# Patient Record
Sex: MALE | Race: ASIAN | NOT HISPANIC OR LATINO | URBAN - METROPOLITAN AREA
[De-identification: names, ages, dates, MRNs, and addresses within clinical notes are randomized per-mention and may not be internally consistent; named-entity substitution may affect disease eponyms.]

---

## 2023-08-14 PROBLEM — Z00.00 ENCOUNTER FOR PREVENTIVE HEALTH EXAMINATION: Status: ACTIVE | Noted: 2023-08-14

## 2023-08-15 ENCOUNTER — OUTPATIENT (OUTPATIENT)
Dept: OUTPATIENT SERVICES | Facility: HOSPITAL | Age: 63
LOS: 1 days | End: 2023-08-15
Payer: MEDICARE

## 2023-08-15 ENCOUNTER — APPOINTMENT (OUTPATIENT)
Dept: CT IMAGING | Facility: CLINIC | Age: 63
End: 2023-08-15
Payer: MEDICARE

## 2023-08-15 ENCOUNTER — APPOINTMENT (OUTPATIENT)
Dept: MRI IMAGING | Facility: CLINIC | Age: 63
End: 2023-08-15
Payer: MEDICARE

## 2023-08-15 DIAGNOSIS — C65.1 MALIGNANT NEOPLASM OF RIGHT RENAL PELVIS: ICD-10-CM

## 2023-08-15 DIAGNOSIS — R91.8 OTHER NONSPECIFIC ABNORMAL FINDING OF LUNG FIELD: ICD-10-CM

## 2023-08-15 PROCEDURE — 71250 CT THORAX DX C-: CPT | Mod: 26

## 2023-08-15 PROCEDURE — 71250 CT THORAX DX C-: CPT

## 2023-08-15 PROCEDURE — 74181 MRI ABDOMEN W/O CONTRAST: CPT

## 2023-08-15 PROCEDURE — 74181 MRI ABDOMEN W/O CONTRAST: CPT | Mod: 26

## 2023-09-12 ENCOUNTER — TRANSCRIPTION ENCOUNTER (OUTPATIENT)
Age: 63
End: 2023-09-12

## 2023-09-12 ENCOUNTER — APPOINTMENT (OUTPATIENT)
Dept: CARDIOLOGY | Facility: CLINIC | Age: 63
End: 2023-09-12
Payer: MEDICARE

## 2023-09-12 ENCOUNTER — NON-APPOINTMENT (OUTPATIENT)
Age: 63
End: 2023-09-12

## 2023-09-12 VITALS
WEIGHT: 190 LBS | OXYGEN SATURATION: 100 % | SYSTOLIC BLOOD PRESSURE: 137 MMHG | DIASTOLIC BLOOD PRESSURE: 83 MMHG | HEART RATE: 75 BPM | TEMPERATURE: 97.6 F | HEIGHT: 71.26 IN | RESPIRATION RATE: 18 BRPM | BODY MASS INDEX: 26.31 KG/M2

## 2023-09-12 DIAGNOSIS — Z85.528 PERSONAL HISTORY OF OTHER MALIGNANT NEOPLASM OF KIDNEY: ICD-10-CM

## 2023-09-12 PROCEDURE — 99205 OFFICE O/P NEW HI 60 MIN: CPT | Mod: 25

## 2023-09-12 PROCEDURE — 93000 ELECTROCARDIOGRAM COMPLETE: CPT

## 2023-09-12 PROCEDURE — 93306 TTE W/DOPPLER COMPLETE: CPT

## 2023-09-28 ENCOUNTER — APPOINTMENT (OUTPATIENT)
Dept: CARDIOLOGY | Facility: CLINIC | Age: 63
End: 2023-09-28

## 2023-09-28 ENCOUNTER — NON-APPOINTMENT (OUTPATIENT)
Age: 63
End: 2023-09-28

## 2023-10-27 ENCOUNTER — APPOINTMENT (OUTPATIENT)
Dept: NEPHROLOGY | Facility: CLINIC | Age: 63
End: 2023-10-27

## 2023-11-07 ENCOUNTER — NON-APPOINTMENT (OUTPATIENT)
Age: 63
End: 2023-11-07

## 2023-11-07 ENCOUNTER — APPOINTMENT (OUTPATIENT)
Dept: CARDIOLOGY | Facility: CLINIC | Age: 63
End: 2023-11-07
Payer: MEDICARE

## 2023-11-07 VITALS
SYSTOLIC BLOOD PRESSURE: 123 MMHG | DIASTOLIC BLOOD PRESSURE: 72 MMHG | TEMPERATURE: 97.3 F | WEIGHT: 190 LBS | RESPIRATION RATE: 18 BRPM | BODY MASS INDEX: 26.31 KG/M2 | OXYGEN SATURATION: 99 % | HEART RATE: 71 BPM

## 2023-11-07 PROCEDURE — 99214 OFFICE O/P EST MOD 30 MIN: CPT | Mod: 25

## 2023-11-07 PROCEDURE — 93000 ELECTROCARDIOGRAM COMPLETE: CPT

## 2023-11-14 ENCOUNTER — OUTPATIENT (OUTPATIENT)
Dept: OUTPATIENT SERVICES | Facility: HOSPITAL | Age: 63
LOS: 1 days | End: 2023-11-14
Payer: MEDICARE

## 2023-11-14 ENCOUNTER — APPOINTMENT (OUTPATIENT)
Dept: CT IMAGING | Facility: CLINIC | Age: 63
End: 2023-11-14
Payer: MEDICARE

## 2023-11-14 DIAGNOSIS — R91.8 OTHER NONSPECIFIC ABNORMAL FINDING OF LUNG FIELD: ICD-10-CM

## 2023-11-14 DIAGNOSIS — C65.1 MALIGNANT NEOPLASM OF RIGHT RENAL PELVIS: ICD-10-CM

## 2023-11-14 PROCEDURE — 71250 CT THORAX DX C-: CPT

## 2023-11-14 PROCEDURE — 71250 CT THORAX DX C-: CPT | Mod: 26

## 2023-12-05 ENCOUNTER — APPOINTMENT (OUTPATIENT)
Dept: CARDIOLOGY | Facility: CLINIC | Age: 63
End: 2023-12-05

## 2023-12-05 ENCOUNTER — APPOINTMENT (OUTPATIENT)
Dept: CARDIOLOGY | Facility: CLINIC | Age: 63
End: 2023-12-05
Payer: MEDICARE

## 2023-12-05 ENCOUNTER — NON-APPOINTMENT (OUTPATIENT)
Age: 63
End: 2023-12-05

## 2023-12-05 VITALS
BODY MASS INDEX: 26.31 KG/M2 | RESPIRATION RATE: 18 BRPM | SYSTOLIC BLOOD PRESSURE: 145 MMHG | TEMPERATURE: 98 F | HEART RATE: 74 BPM | DIASTOLIC BLOOD PRESSURE: 82 MMHG | OXYGEN SATURATION: 99 % | WEIGHT: 190 LBS

## 2023-12-05 PROCEDURE — 93000 ELECTROCARDIOGRAM COMPLETE: CPT | Mod: NC

## 2023-12-05 PROCEDURE — 99214 OFFICE O/P EST MOD 30 MIN: CPT | Mod: 25

## 2023-12-07 ENCOUNTER — APPOINTMENT (OUTPATIENT)
Dept: MRI IMAGING | Facility: CLINIC | Age: 63
End: 2023-12-07
Payer: MEDICARE

## 2023-12-07 ENCOUNTER — OUTPATIENT (OUTPATIENT)
Dept: OUTPATIENT SERVICES | Facility: HOSPITAL | Age: 63
LOS: 1 days | End: 2023-12-07
Payer: MEDICARE

## 2023-12-07 DIAGNOSIS — R91.8 OTHER NONSPECIFIC ABNORMAL FINDING OF LUNG FIELD: ICD-10-CM

## 2023-12-07 DIAGNOSIS — C65.1 MALIGNANT NEOPLASM OF RIGHT RENAL PELVIS: ICD-10-CM

## 2023-12-07 PROCEDURE — 74181 MRI ABDOMEN W/O CONTRAST: CPT | Mod: 26

## 2023-12-07 PROCEDURE — 74181 MRI ABDOMEN W/O CONTRAST: CPT

## 2023-12-12 ENCOUNTER — APPOINTMENT (OUTPATIENT)
Dept: THORACIC SURGERY | Facility: CLINIC | Age: 63
End: 2023-12-12
Payer: MEDICARE

## 2023-12-12 ENCOUNTER — NON-APPOINTMENT (OUTPATIENT)
Age: 63
End: 2023-12-12

## 2023-12-12 VITALS
RESPIRATION RATE: 17 BRPM | DIASTOLIC BLOOD PRESSURE: 73 MMHG | HEIGHT: 72 IN | WEIGHT: 190 LBS | HEART RATE: 75 BPM | SYSTOLIC BLOOD PRESSURE: 130 MMHG | OXYGEN SATURATION: 100 % | BODY MASS INDEX: 25.73 KG/M2

## 2023-12-12 DIAGNOSIS — Z83.511 FAMILY HISTORY OF GLAUCOMA: ICD-10-CM

## 2023-12-12 DIAGNOSIS — Z86.79 PERSONAL HISTORY OF OTHER DISEASES OF THE CIRCULATORY SYSTEM: ICD-10-CM

## 2023-12-12 DIAGNOSIS — R59.0 LOCALIZED ENLARGED LYMPH NODES: ICD-10-CM

## 2023-12-12 DIAGNOSIS — Z83.438 FAMILY HISTORY OF OTHER DISORDER OF LIPOPROTEIN METABOLISM AND OTHER LIPIDEMIA: ICD-10-CM

## 2023-12-12 DIAGNOSIS — Z83.3 FAMILY HISTORY OF DIABETES MELLITUS: ICD-10-CM

## 2023-12-12 DIAGNOSIS — R91.8 OTHER NONSPECIFIC ABNORMAL FINDING OF LUNG FIELD: ICD-10-CM

## 2023-12-12 DIAGNOSIS — Z82.49 FAMILY HISTORY OF ISCHEMIC HEART DISEASE AND OTHER DISEASES OF THE CIRCULATORY SYSTEM: ICD-10-CM

## 2023-12-12 DIAGNOSIS — Z86.69 PERSONAL HISTORY OF OTHER DISEASES OF THE NERVOUS SYSTEM AND SENSE ORGANS: ICD-10-CM

## 2023-12-12 DIAGNOSIS — Z84.1 FAMILY HISTORY OF DISORDERS OF KIDNEY AND URETER: ICD-10-CM

## 2023-12-12 DIAGNOSIS — Z87.891 PERSONAL HISTORY OF NICOTINE DEPENDENCE: ICD-10-CM

## 2023-12-12 DIAGNOSIS — Z87.09 PERSONAL HISTORY OF OTHER DISEASES OF THE RESPIRATORY SYSTEM: ICD-10-CM

## 2023-12-12 PROCEDURE — 99245 OFF/OP CONSLTJ NEW/EST HI 55: CPT

## 2023-12-13 PROBLEM — R91.8 MULTIPLE LUNG NODULES: Status: ACTIVE | Noted: 2023-12-10

## 2023-12-13 PROBLEM — Z86.69 HISTORY OF SLEEP APNEA: Status: RESOLVED | Noted: 2023-12-13 | Resolved: 2023-12-13

## 2023-12-13 PROBLEM — R59.0 MEDIASTINAL LYMPHADENOPATHY: Status: ACTIVE | Noted: 2023-12-10

## 2023-12-13 PROBLEM — Z87.09 HISTORY OF ASTHMA: Status: RESOLVED | Noted: 2023-12-13 | Resolved: 2023-12-13

## 2023-12-13 PROBLEM — Z83.438 FAMILY HISTORY OF HYPERLIPIDEMIA: Status: ACTIVE | Noted: 2023-12-13

## 2023-12-13 PROBLEM — Z82.49 FAMILY HISTORY OF HYPERTENSION: Status: ACTIVE | Noted: 2023-12-13

## 2023-12-13 PROBLEM — Z83.3 FAMILY HISTORY OF DIABETES MELLITUS: Status: ACTIVE | Noted: 2023-12-13

## 2023-12-13 PROBLEM — Z83.511 FAMILY HISTORY OF GLAUCOMA: Status: ACTIVE | Noted: 2023-12-13

## 2023-12-13 PROBLEM — Z86.79 HISTORY OF HYPERTENSION: Status: RESOLVED | Noted: 2023-12-13 | Resolved: 2023-12-13

## 2023-12-13 PROBLEM — Z87.891 FORMER SMOKER: Status: ACTIVE | Noted: 2023-09-12

## 2023-12-13 PROBLEM — Z84.1 FAMILY HISTORY OF KIDNEY DISEASE: Status: ACTIVE | Noted: 2023-12-13

## 2023-12-13 RX ORDER — ROSUVASTATIN CALCIUM 5 MG/1
5 TABLET, FILM COATED ORAL
Refills: 0 | Status: ACTIVE | COMMUNITY

## 2023-12-13 RX ORDER — ASPIRIN 81 MG
81 TABLET, DELAYED RELEASE (ENTERIC COATED) ORAL
Refills: 0 | Status: ACTIVE | COMMUNITY

## 2023-12-13 RX ORDER — TAMSULOSIN HYDROCHLORIDE 0.4 MG/1
0.4 CAPSULE ORAL
Refills: 0 | Status: ACTIVE | COMMUNITY

## 2023-12-13 NOTE — ASSESSMENT
[FreeTextEntry1] : Mr. MARKUS SPENCE, 63 year old male, former smoker (1/2 PPD x 45 years. Quit 5/ 2023), w/ hx of HTN, Asthma, sleep apnea, clear cell RCC s/p nephrectomy 5/16/23, CAD with DAVION placed to LCx 6/2023; On Plavix. Referred by Dr. Hon Mariajose Montemayor for further evaluation of bilateral lung nodules as well as enlarged AP LN.   I have independently reviewed the medical records and imaging at the time of this office consultation, and discussed the following interpretations with the patient: - CT imaging reviewed. Findings are suspicious. Discussed necessity of tissue diagnosis, which will be needed to dictate further treatment. Therefore, Recommend a Flex Bronch, EBUS, possible Right VATS, Wedge Resection of Right lower lobe. Risks, benefits and alternatives explained to patient; All questions answered and patient agrees to proceed with surgery.  - PFTs and Cardiac clearance required prior to procedure. Hold Plavix as per Cardiology's recs  Recommendations reviewed with patient during this office visit, and all questions answered; Patient instructed on the importance of follow up and verbalizes understanding.   I, CLAUDINE Coleman, personally performed the evaluation and management (E/M) services for this new patient. That E/M includes conducting the initial examination, assessing all conditions, and establishing the plan of care. Today, My ACP, Jennyfer Morrow, was here to observe my evaluation and management services for this patient to be followed going forward.

## 2023-12-13 NOTE — PHYSICAL EXAM
[Fully active, able to carry on all pre-disease performance without restriction] : Status 0 - Fully active, able to carry on all pre-disease performance without restriction [General Appearance - Alert] : alert [General Appearance - In No Acute Distress] : in no acute distress [General Appearance - Well Nourished] : well nourished [Sclera] : the sclera and conjunctiva were normal [Extraocular Movements] : extraocular movements were intact [Outer Ear] : the ears and nose were normal in appearance [Hearing Threshold Finger Rub Not Sawyer] : hearing was normal [Both Tympanic Membranes Were Examined] : both tympanic membranes were normal [Neck Appearance] : the appearance of the neck was normal [Neck Cervical Mass (___cm)] : no neck mass was observed [] : no respiratory distress [Respiration, Rhythm And Depth] : normal respiratory rhythm and effort [Exaggerated Use Of Accessory Muscles For Inspiration] : no accessory muscle use [Auscultation Breath Sounds / Voice Sounds] : lungs were clear to auscultation bilaterally [Heart Rate And Rhythm] : heart rate was normal and rhythm regular [Examination Of The Chest] : the chest was normal in appearance [Chest Visual Inspection Thoracic Asymmetry] : no chest asymmetry [Diminished Respiratory Excursion] : normal chest expansion [2+] : left 2+ [Breast Appearance] : normal in appearance [Breast Palpation Mass] : no palpable masses [Bowel Sounds] : normal bowel sounds [Abdomen Soft] : soft [Abdomen Tenderness] : non-tender [Cervical Lymph Nodes Enlarged Posterior Bilaterally] : posterior cervical [Cervical Lymph Nodes Enlarged Anterior Bilaterally] : anterior cervical [Supraclavicular Lymph Nodes Enlarged Bilaterally] : supraclavicular [No CVA Tenderness] : no ~M costovertebral angle tenderness [No Spinal Tenderness] : no spinal tenderness [Involuntary Movements] : no involuntary movements were seen [Skin Color & Pigmentation] : normal skin color and pigmentation [No Focal Deficits] : no focal deficits [Oriented To Time, Place, And Person] : oriented to person, place, and time [FreeTextEntry1] : Deferred

## 2023-12-13 NOTE — HISTORY OF PRESENT ILLNESS
[FreeTextEntry1] : Mr. MARKUS SPENCE, 63 year old male, former smoker (1/2 PPD x 45 years. Quit 5/ 2023), w/ hx of HTN, Asthma, sleep apnea, clear cell RCC s/p nephrectomy 5/16/23, CAD with DAVION placed to LCx 6/2023; On Plavix. Referred by Dr. Hon Mariajose Montemayor for further evaluation of bilateral lung nodules as well as enlarged AP LN.   CT Chest on 11/14/2023:  - JOHN 5mm nodule (3,66) new from prior study - RUL 8 mm nodule (3, 82) also new from the prior study - RLL 1.4 cm nodule (3, 135) is enlarged from the prior study  - Other Bilateral lung nodules. Some unchanged, some slightly enlarged from the prior study - 1.5 cm AP Window LN is enlarged from the prior study  - Hepatic Cysts  Patient presents today for consultation. Today, patient denies Chest pain, hemoptysis, fever, chills, night sweats, lightheadedness or dizziness.

## 2023-12-18 ENCOUNTER — OUTPATIENT (OUTPATIENT)
Dept: OUTPATIENT SERVICES | Facility: HOSPITAL | Age: 63
LOS: 1 days | End: 2023-12-18

## 2023-12-18 VITALS
TEMPERATURE: 98 F | RESPIRATION RATE: 16 BRPM | HEIGHT: 71 IN | WEIGHT: 192.02 LBS | HEART RATE: 75 BPM | DIASTOLIC BLOOD PRESSURE: 82 MMHG | OXYGEN SATURATION: 99 % | SYSTOLIC BLOOD PRESSURE: 145 MMHG

## 2023-12-18 DIAGNOSIS — I25.10 ATHEROSCLEROTIC HEART DISEASE OF NATIVE CORONARY ARTERY WITHOUT ANGINA PECTORIS: ICD-10-CM

## 2023-12-18 DIAGNOSIS — R59.0 LOCALIZED ENLARGED LYMPH NODES: ICD-10-CM

## 2023-12-18 DIAGNOSIS — Z90.5 ACQUIRED ABSENCE OF KIDNEY: Chronic | ICD-10-CM

## 2023-12-18 DIAGNOSIS — R91.8 OTHER NONSPECIFIC ABNORMAL FINDING OF LUNG FIELD: ICD-10-CM

## 2023-12-18 DIAGNOSIS — Z98.890 OTHER SPECIFIED POSTPROCEDURAL STATES: Chronic | ICD-10-CM

## 2023-12-18 LAB
ALBUMIN SERPL ELPH-MCNC: 4 G/DL — SIGNIFICANT CHANGE UP (ref 3.3–5)
ALBUMIN SERPL ELPH-MCNC: 4 G/DL — SIGNIFICANT CHANGE UP (ref 3.3–5)
ALP SERPL-CCNC: 61 U/L — SIGNIFICANT CHANGE UP (ref 40–120)
ALP SERPL-CCNC: 61 U/L — SIGNIFICANT CHANGE UP (ref 40–120)
ALT FLD-CCNC: 15 U/L — SIGNIFICANT CHANGE UP (ref 4–41)
ALT FLD-CCNC: 15 U/L — SIGNIFICANT CHANGE UP (ref 4–41)
ANION GAP SERPL CALC-SCNC: 9 MMOL/L — SIGNIFICANT CHANGE UP (ref 7–14)
ANION GAP SERPL CALC-SCNC: 9 MMOL/L — SIGNIFICANT CHANGE UP (ref 7–14)
AST SERPL-CCNC: 15 U/L — SIGNIFICANT CHANGE UP (ref 4–40)
AST SERPL-CCNC: 15 U/L — SIGNIFICANT CHANGE UP (ref 4–40)
BILIRUB SERPL-MCNC: 0.4 MG/DL — SIGNIFICANT CHANGE UP (ref 0.2–1.2)
BILIRUB SERPL-MCNC: 0.4 MG/DL — SIGNIFICANT CHANGE UP (ref 0.2–1.2)
BLD GP AB SCN SERPL QL: NEGATIVE — SIGNIFICANT CHANGE UP
BLD GP AB SCN SERPL QL: NEGATIVE — SIGNIFICANT CHANGE UP
BUN SERPL-MCNC: 26 MG/DL — HIGH (ref 7–23)
BUN SERPL-MCNC: 26 MG/DL — HIGH (ref 7–23)
CALCIUM SERPL-MCNC: 9.3 MG/DL — SIGNIFICANT CHANGE UP (ref 8.4–10.5)
CALCIUM SERPL-MCNC: 9.3 MG/DL — SIGNIFICANT CHANGE UP (ref 8.4–10.5)
CHLORIDE SERPL-SCNC: 100 MMOL/L — SIGNIFICANT CHANGE UP (ref 98–107)
CHLORIDE SERPL-SCNC: 100 MMOL/L — SIGNIFICANT CHANGE UP (ref 98–107)
CO2 SERPL-SCNC: 27 MMOL/L — SIGNIFICANT CHANGE UP (ref 22–31)
CO2 SERPL-SCNC: 27 MMOL/L — SIGNIFICANT CHANGE UP (ref 22–31)
CREAT SERPL-MCNC: 1.57 MG/DL — HIGH (ref 0.5–1.3)
CREAT SERPL-MCNC: 1.57 MG/DL — HIGH (ref 0.5–1.3)
EGFR: 49 ML/MIN/1.73M2 — LOW
EGFR: 49 ML/MIN/1.73M2 — LOW
GLUCOSE SERPL-MCNC: 82 MG/DL — SIGNIFICANT CHANGE UP (ref 70–99)
GLUCOSE SERPL-MCNC: 82 MG/DL — SIGNIFICANT CHANGE UP (ref 70–99)
HCT VFR BLD CALC: 38.6 % — LOW (ref 39–50)
HCT VFR BLD CALC: 38.6 % — LOW (ref 39–50)
HGB BLD-MCNC: 12.7 G/DL — LOW (ref 13–17)
HGB BLD-MCNC: 12.7 G/DL — LOW (ref 13–17)
MCHC RBC-ENTMCNC: 29.7 PG — SIGNIFICANT CHANGE UP (ref 27–34)
MCHC RBC-ENTMCNC: 29.7 PG — SIGNIFICANT CHANGE UP (ref 27–34)
MCHC RBC-ENTMCNC: 32.9 GM/DL — SIGNIFICANT CHANGE UP (ref 32–36)
MCHC RBC-ENTMCNC: 32.9 GM/DL — SIGNIFICANT CHANGE UP (ref 32–36)
MCV RBC AUTO: 90.2 FL — SIGNIFICANT CHANGE UP (ref 80–100)
MCV RBC AUTO: 90.2 FL — SIGNIFICANT CHANGE UP (ref 80–100)
NRBC # BLD: 0 /100 WBCS — SIGNIFICANT CHANGE UP (ref 0–0)
NRBC # BLD: 0 /100 WBCS — SIGNIFICANT CHANGE UP (ref 0–0)
NRBC # FLD: 0 K/UL — SIGNIFICANT CHANGE UP (ref 0–0)
NRBC # FLD: 0 K/UL — SIGNIFICANT CHANGE UP (ref 0–0)
PLATELET # BLD AUTO: 309 K/UL — SIGNIFICANT CHANGE UP (ref 150–400)
PLATELET # BLD AUTO: 309 K/UL — SIGNIFICANT CHANGE UP (ref 150–400)
POTASSIUM SERPL-MCNC: 4.3 MMOL/L — SIGNIFICANT CHANGE UP (ref 3.5–5.3)
POTASSIUM SERPL-MCNC: 4.3 MMOL/L — SIGNIFICANT CHANGE UP (ref 3.5–5.3)
POTASSIUM SERPL-SCNC: 4.3 MMOL/L — SIGNIFICANT CHANGE UP (ref 3.5–5.3)
POTASSIUM SERPL-SCNC: 4.3 MMOL/L — SIGNIFICANT CHANGE UP (ref 3.5–5.3)
PROT SERPL-MCNC: 7.9 G/DL — SIGNIFICANT CHANGE UP (ref 6–8.3)
PROT SERPL-MCNC: 7.9 G/DL — SIGNIFICANT CHANGE UP (ref 6–8.3)
RBC # BLD: 4.28 M/UL — SIGNIFICANT CHANGE UP (ref 4.2–5.8)
RBC # BLD: 4.28 M/UL — SIGNIFICANT CHANGE UP (ref 4.2–5.8)
RBC # FLD: 12.4 % — SIGNIFICANT CHANGE UP (ref 10.3–14.5)
RBC # FLD: 12.4 % — SIGNIFICANT CHANGE UP (ref 10.3–14.5)
RH IG SCN BLD-IMP: POSITIVE — SIGNIFICANT CHANGE UP
RH IG SCN BLD-IMP: POSITIVE — SIGNIFICANT CHANGE UP
SODIUM SERPL-SCNC: 136 MMOL/L — SIGNIFICANT CHANGE UP (ref 135–145)
SODIUM SERPL-SCNC: 136 MMOL/L — SIGNIFICANT CHANGE UP (ref 135–145)
WBC # BLD: 7.18 K/UL — SIGNIFICANT CHANGE UP (ref 3.8–10.5)
WBC # BLD: 7.18 K/UL — SIGNIFICANT CHANGE UP (ref 3.8–10.5)
WBC # FLD AUTO: 7.18 K/UL — SIGNIFICANT CHANGE UP (ref 3.8–10.5)
WBC # FLD AUTO: 7.18 K/UL — SIGNIFICANT CHANGE UP (ref 3.8–10.5)

## 2023-12-18 RX ORDER — SODIUM CHLORIDE 9 MG/ML
1000 INJECTION, SOLUTION INTRAVENOUS
Refills: 0 | Status: DISCONTINUED | OUTPATIENT
Start: 2023-12-22 | End: 2023-12-22

## 2023-12-18 NOTE — H&P PST ADULT - NSICDXPASTMEDICALHX_GEN_ALL_CORE_FT
PAST MEDICAL HISTORY:  CAD (coronary artery disease)     HTN (hypertension)     Lung disorder      PAST MEDICAL HISTORY:  CAD (coronary artery disease)     Cancer of right kidney     Former smoker     History of BPH     HLD (hyperlipidemia)     HTN (hypertension)     Lung disorder

## 2023-12-18 NOTE — H&P PST ADULT - CARDIOVASCULAR COMMENTS
Hx CAD 1 stent placed 6/5/23 and on Plavix / ASA - Cardio confirmed pt may stop Plavix 12/15/23 and stay on ASA

## 2023-12-18 NOTE — H&P PST ADULT - PROBLEM SELECTOR PLAN 1
Pt Name and  verified. Patient informed that results are still processing and verbalized understanding. Pt scheduled for surgery and preop instructions including instructions for taking Famotidine and for Chlorhexidine use in showering on the day of surgery, given verbally and with use of  written materials, and patient confirming understanding of such instructions using  teach back method.  Cardiac evaluation done and pt stopped Plavix 12/15/23 as per Cardiologist - request copy of note and of last Echo

## 2023-12-19 ENCOUNTER — APPOINTMENT (OUTPATIENT)
Dept: PULMONOLOGY | Facility: CLINIC | Age: 63
End: 2023-12-19
Payer: MEDICARE

## 2023-12-19 VITALS
WEIGHT: 193 LBS | BODY MASS INDEX: 27.32 KG/M2 | DIASTOLIC BLOOD PRESSURE: 68 MMHG | HEIGHT: 70.28 IN | OXYGEN SATURATION: 99 % | HEART RATE: 68 BPM | SYSTOLIC BLOOD PRESSURE: 109 MMHG

## 2023-12-19 LAB
RH IG SCN BLD-IMP: POSITIVE — SIGNIFICANT CHANGE UP
RH IG SCN BLD-IMP: POSITIVE — SIGNIFICANT CHANGE UP

## 2023-12-19 PROCEDURE — 94726 PLETHYSMOGRAPHY LUNG VOLUMES: CPT

## 2023-12-19 PROCEDURE — 94729 DIFFUSING CAPACITY: CPT

## 2023-12-19 PROCEDURE — 94010 BREATHING CAPACITY TEST: CPT

## 2023-12-19 NOTE — HISTORY OF PRESENT ILLNESS
[FreeTextEntry1] : He needs clearance for EBUS biopsy for lung nodule. He reports feeling stable. Denies chest pain or SOB. He is still exercising/walking at least 1 hour every day without symptoms.   11/7/23 - Pt saw Dr. Eddy (nephrology) underwent labs which showed Cr 1.7 on 9/22/23. He reports feeling stable without chest pain or SOB. No orthopnea, PND, or LE edema. He is exercising/walking at least 1 hour every day without limitation.  9/12/23 - Pt was in [facility] for vacation and underwent CCTA for routine checkup which found CAD. He was ultimately admitted to hospital in [facility] 6/4-6/6/23 with SOB on exertion x 6 months and underwent LHC which found 100% LCx stenosis with R to L collaterals. Based on report, it appears he underwent DAVION (2.5 x 18mm [employer] stent) to dLCx c/b vessel dissection. He has residual mid/distal RCA stenosis (80%) which was not intervened on otherwise normal LM and normal LAD. He also had an echo in [facility] 4/17/23 showing normal LVEF 60% with no significant valvular disease. He also has b/l lung nodules seen on CT 4/17/23.  Since coming back to the [facility], pt has seen oncologist who recommended 3 month f/u for his lung nodules. His recent labs showed BUN/Cr 33/1.95 7/31/23 and LDL 38. He states that prior to PCI, he had significant SOB and occasional chest discomfort with exertion. He is now walking at least 1 km per day on a daily basis without chest pain or SOB. He has no orthopnea, PND, LE edema, dizziness, palpitations, or LOC.

## 2023-12-19 NOTE — REASON FOR VISIT
[FreeTextEntry1] : 63 year old M with history of clear cell RCC s/p nephrectomy 5/16/23, CAD with DAVION placed to 100% LCx  with residual 80% m/d RCA stenosis 6/5/23 who presents for f/u.  He is on ASA 81, Plavix 75mg daily, and Crestor 5mg daily.

## 2023-12-19 NOTE — ASSESSMENT
[FreeTextEntry1] : 63 year old M with history of clear cell RCC s/p nephrectomy 5/16/23, CAD with DAVION placed to 100% LCx  with residual 80% m/d RCA stenosis 6/5/23 who presents for f/u.  He is on ASA 81, Plavix 75mg daily, and Crestor 5mg daily.  He needs clearance for EBUS biopsy for lung nodule. He reports feeling stable. Denies chest pain or SOB. He is still exercising/walking at least 1 hour every day without symptoms.   1) Pre-op cardiac clearance, for EBUS biopsy  - His RCRI is 2 and his risk factors likely elevate his cardiac risk for planned EBUS biopsy. However, he is asymptomatic and reports good exercise tolerance. - EKG 12/5/23 showed normal sinus rhythm without ischemic changes - There is no evidence of active ACS, arrhythmia, clinical heart failure, or significant valvular disease. He is medically optimized from cardiac standpoint and may proceed to planned EBUS biopsy without further cardiac testing - Given it has now been greater than 6 months s/p stent, he may hold Plavix for 5-7 days prior to EBUS biopsy and should resume as soon as safe from surgical perspective. He should continue ASA 81mg perioperatively.  2) CAD s/p stent - LHC 6/5/23 with 100% LCx stenosis with R to L collaterals. Based on report, it appears he underwent DAVION (2.5 x 18mm Ini3 Digital stent) to dLCx c/b vessel dissection. He has residual mid/distal RCA stenosis (80%) which was not intervened on otherwise normal LM and normal LAD. - TTE 9/12/23 showed grossly normal LVEF 65-70%, normal RV size/function, and mild TR - Continue ASA 81 and Plavix 75mg daily for total of 1 year after stenting - Continue Crestor 5mg daily, goal LDL 55 (last LDL was 38) - In view of CKD, lack of symptoms, good exercise tolerance after PCI, continue med management for now. If pt becomes symptomatic, will consider nuclear stress to assess for ischemia vs. LHC to intervene on RCA.  3) CKD, last Cr 1.7 - f/u with Dr. Eddy  4) Follow-up, 3 months or sooner if symptomatic

## 2023-12-21 ENCOUNTER — TRANSCRIPTION ENCOUNTER (OUTPATIENT)
Age: 63
End: 2023-12-21

## 2023-12-21 NOTE — ASU PATIENT PROFILE, ADULT - PACKS PER DAY
Please call patient. Will not need labs at time of physical- will plan to wait to do later in the year. 0.5

## 2023-12-21 NOTE — ASU PATIENT PROFILE, ADULT - NSICDXPASTMEDICALHX_GEN_ALL_CORE_FT
PAST MEDICAL HISTORY:  CAD (coronary artery disease)     Cancer of right kidney     Former smoker     History of BPH     HLD (hyperlipidemia)     HTN (hypertension)     Lung disorder

## 2023-12-21 NOTE — ASU PATIENT PROFILE, ADULT - NUMBER OF YRS
I, the Attending Physician, certify the above stated patient is homebound and upon completion of the Face-To-Face encounter, has the need for intermittent skilled nursing, physical therapy and/or speech or occupational therapy services in their home for their current diagnosis as outlined in their initiial plan of care. These services will continue to be monitored by myself or another physician
45

## 2023-12-21 NOTE — ASU PATIENT PROFILE, ADULT - FALL HARM RISK - UNIVERSAL INTERVENTIONS
Bed in lowest position, wheels locked, appropriate side rails in place/Call bell, personal items and telephone in reach/Instruct patient to call for assistance before getting out of bed or chair/Non-slip footwear when patient is out of bed/[place] to call system/Physically safe environment - no spills, clutter or unnecessary equipment/Purposeful Proactive Rounding/Room/bathroom lighting operational, light cord in reach Bed in lowest position, wheels locked, appropriate side rails in place/Call bell, personal items and telephone in reach/Instruct patient to call for assistance before getting out of bed or chair/Non-slip footwear when patient is out of bed/[place] to call system/Physically safe environment - no spills, clutter or unnecessary equipment/Purposeful Proactive Rounding/Room/bathroom lighting operational, light cord in reach

## 2023-12-22 ENCOUNTER — TRANSCRIPTION ENCOUNTER (OUTPATIENT)
Age: 63
End: 2023-12-22

## 2023-12-22 ENCOUNTER — RESULT REVIEW (OUTPATIENT)
Age: 63
End: 2023-12-22

## 2023-12-22 ENCOUNTER — APPOINTMENT (OUTPATIENT)
Dept: THORACIC SURGERY | Facility: HOSPITAL | Age: 63
End: 2023-12-22

## 2023-12-22 ENCOUNTER — INPATIENT (INPATIENT)
Facility: HOSPITAL | Age: 63
LOS: 0 days | Discharge: HOME | End: 2023-12-22
Attending: THORACIC SURGERY (CARDIOTHORACIC VASCULAR SURGERY) | Admitting: THORACIC SURGERY (CARDIOTHORACIC VASCULAR SURGERY)
Payer: MEDICARE

## 2023-12-22 VITALS
HEIGHT: 72 IN | DIASTOLIC BLOOD PRESSURE: 64 MMHG | WEIGHT: 190.92 LBS | RESPIRATION RATE: 16 BRPM | OXYGEN SATURATION: 98 % | TEMPERATURE: 98 F | HEART RATE: 73 BPM | SYSTOLIC BLOOD PRESSURE: 115 MMHG

## 2023-12-22 VITALS
HEART RATE: 74 BPM | OXYGEN SATURATION: 99 % | DIASTOLIC BLOOD PRESSURE: 68 MMHG | TEMPERATURE: 98 F | SYSTOLIC BLOOD PRESSURE: 140 MMHG | RESPIRATION RATE: 14 BRPM

## 2023-12-22 DIAGNOSIS — R59.0 LOCALIZED ENLARGED LYMPH NODES: ICD-10-CM

## 2023-12-22 DIAGNOSIS — Z90.5 ACQUIRED ABSENCE OF KIDNEY: Chronic | ICD-10-CM

## 2023-12-22 DIAGNOSIS — Z98.890 OTHER SPECIFIED POSTPROCEDURAL STATES: Chronic | ICD-10-CM

## 2023-12-22 PROCEDURE — 71045 X-RAY EXAM CHEST 1 VIEW: CPT | Mod: 26

## 2023-12-22 PROCEDURE — 31652 BRONCH EBUS SAMPLNG 1/2 NODE: CPT

## 2023-12-22 RX ORDER — ONDANSETRON 8 MG/1
4 TABLET, FILM COATED ORAL ONCE
Refills: 0 | Status: DISCONTINUED | OUTPATIENT
Start: 2023-12-22 | End: 2023-12-22

## 2023-12-22 RX ORDER — ACETAMINOPHEN 500 MG
650 TABLET ORAL ONCE
Refills: 0 | Status: DISCONTINUED | OUTPATIENT
Start: 2023-12-22 | End: 2023-12-22

## 2023-12-22 RX ORDER — ACETAMINOPHEN 500 MG
975 TABLET ORAL ONCE
Refills: 0 | Status: COMPLETED | OUTPATIENT
Start: 2023-12-22 | End: 2023-12-22

## 2023-12-22 RX ORDER — FENTANYL CITRATE 50 UG/ML
25 INJECTION INTRAVENOUS
Refills: 0 | Status: DISCONTINUED | OUTPATIENT
Start: 2023-12-22 | End: 2023-12-22

## 2023-12-22 RX ORDER — HEPARIN SODIUM 5000 [USP'U]/ML
5000 INJECTION INTRAVENOUS; SUBCUTANEOUS ONCE
Refills: 0 | Status: COMPLETED | OUTPATIENT
Start: 2023-12-22 | End: 2023-12-22

## 2023-12-22 RX ORDER — FENTANYL CITRATE 50 UG/ML
50 INJECTION INTRAVENOUS
Refills: 0 | Status: DISCONTINUED | OUTPATIENT
Start: 2023-12-22 | End: 2023-12-22

## 2023-12-22 RX ADMIN — HEPARIN SODIUM 5000 UNIT(S): 5000 INJECTION INTRAVENOUS; SUBCUTANEOUS at 10:14

## 2023-12-22 RX ADMIN — SODIUM CHLORIDE 30 MILLILITER(S): 9 INJECTION, SOLUTION INTRAVENOUS at 10:15

## 2023-12-22 RX ADMIN — Medication 975 MILLIGRAM(S): at 10:13

## 2023-12-22 NOTE — ASU DISCHARGE PLAN (ADULT/PEDIATRIC) - ASU DC SPECIAL INSTRUCTIONSFT
If you cough up blood tinged sputum it is normal. If you cough up more than a tablespoon of bright red blood and it continues. notify MD and return to ER

## 2023-12-22 NOTE — ASU DISCHARGE PLAN (ADULT/PEDIATRIC) - PROVIDER TOKENS
PROVIDER:[TOKEN:[[id number]],FOLLOWUP:[2 weeks]] PROVIDER:[TOKEN:[[id number]],FOLLOWUP:[2 weeks]] PROVIDER:[TOKEN:[[id number]],FOLLOWUP:[2 weeks],ESTABLISHEDPATIENT:[T]] PROVIDER:[TOKEN:[[id number]],FOLLOWUP:[2 weeks],ESTABLISHEDPATIENT:[T]]

## 2023-12-22 NOTE — BRIEF OPERATIVE NOTE - COMMENTS
JONES Villanueva, provided direct first assist support to the surgeon during this surgical procedure. My involvement included positioning, prepping and draping the patient prior to surgery, ensuring clear visibility and exposure for the surgeon by using instruments such as retractors and suction, closing surgical incisions and dressing wounds. As well as other tasks as directed by the surgeon.

## 2023-12-22 NOTE — ASU PREOP CHECKLIST - MEDICAL/PEDIATRIC CLEARANCE ON MEDICAL RECORD
----- Message from Susan Winters MA sent at 8/13/2019  3:47 PM CDT -----  Contact: Self      ----- Message -----  From: Thi Merrill  Sent: 8/13/2019   3:36 PM  To: [facility] Mal[facility] Staff    Geneva Wayne  MRN: [id number]  Home Phone      [phone]  Work Phone      Not on file.  Mobile          [phone]    Patient Care Team:  Sunita Sheriff MD as PCP - General (Family Medicine)  Kira Lloyd MD as Obstetrician (Obstetrics and Gynecology)  OB? No  What phone number can you be reached at? [phone]  Message:   Pt had colpo on 7/18 and has been bleeding since. Please advise.    cardiac

## 2023-12-22 NOTE — ASU DISCHARGE PLAN (ADULT/PEDIATRIC) - CARE PROVIDER_API CALL
Eugene Caraballo  Thoracic Surgery  [address] [place] [address], [address] [place] [address]  Phone: [phone]  Fax: [phone]  Follow Up Time: 2 weeks   Eugene Caraballo  Thoracic Surgery  [address] [place] [address], [address] [place] [address]  Phone: [phone]  Fax: [phone]  Follow Up Time: 2 weeks   Hon Sim Billy  Medical Oncology  [address] [facility], [address] [place] [address]  Phone: [phone]  Fax: [phone]  Established Patient  Follow Up Time: 2 weeks   Hon Sim Billy  Medical Oncology  [address] [facility], [address] [place] [address]  Phone: [phone]  Fax: [phone]  Established Patient  Follow Up Time: 2 weeks

## 2023-12-22 NOTE — ASU DISCHARGE PLAN (ADULT/PEDIATRIC) - COMMENTS
Please call Thoracic Surgery Office to schedule a follow-up appointment Please call Medical Onocology Office to schedule a follow-up appointment

## 2023-12-22 NOTE — ASU DISCHARGE PLAN (ADULT/PEDIATRIC) - NURSING INSTRUCTIONS
DO NOT take any Tylenol (Acetaminophen) or narcotics containing Tylenol until after  __4:15pm____ . You received Tylenol during your operation and it can cause damage to your liver if too much is taken within a 24 hour time period.

## 2023-12-22 NOTE — ASU DISCHARGE PLAN (ADULT/PEDIATRIC) - NS MD DC FALL RISK RISK
For information on Fall & Injury Prevention, visit: [email or web address] OR  [email or web address]/[email or web address]-tips-[email or web address]-injury OR  https://www.cdc.gov/steadi/patient.html For information on Fall & Injury Prevention, visit: [email or web address]://[email or web address]/[email or web address]-[email or web address]-mobility OR  [email or web address]://[email or web address]/[email or web address]-tips-[email or web address]-avoid-injury OR  https://www.cdc.gov/steadi/patient.html

## 2023-12-22 NOTE — BRIEF OPERATIVE NOTE - NSICDXBRIEFPROCEDURE_GEN_ALL_CORE_FT
PROCEDURES:  EBUS, with biopsy of mediastinal lymph node 22-Dec-2023 13:20:43  Fabian Rey  Flexible bronchoscopy 22-Dec-2023 13:20:50  Fabian Rey

## 2023-12-22 NOTE — ASU DISCHARGE PLAN (ADULT/PEDIATRIC) - CALL YOUR DOCTOR IF YOU HAVE ANY OF THE FOLLOWING:
Bleeding that does not stop/Inability to tolerate liquids or foods/Increased irritability or sluggishness Bleeding that does not stop/Fever greater than (need to indicate Fahrenheit or Celsius)/Inability to tolerate liquids or foods/Increased irritability or sluggishness

## 2023-12-22 NOTE — BRIEF OPERATIVE NOTE - OPERATION/FINDINGS
Flexible Bronchoscopy, Endobronchial Ultrasound FNA of Lymph Node Level R4. Flexible Bronchoscopy, Endobronchial Ultrasound FNA of Lymph Node Level L4.

## 2023-12-22 NOTE — ASU PREOP CHECKLIST - AS TEMP SITE
oral Cimetidine Counseling:  I discussed with the patient the risks of Cimetidine including but not limited to gynecomastia, headache, diarrhea, nausea, drowsiness, arrhythmias, pancreatitis, skin rashes, psychosis, bone marrow suppression and kidney toxicity.

## 2023-12-30 PROBLEM — J98.4 OTHER DISORDERS OF LUNG: Chronic | Status: ACTIVE | Noted: 2023-12-18

## 2023-12-30 PROBLEM — E78.5 HYPERLIPIDEMIA, UNSPECIFIED: Chronic | Status: ACTIVE | Noted: 2023-12-18

## 2023-12-30 PROBLEM — C64.1 MALIGNANT NEOPLASM OF RIGHT KIDNEY, EXCEPT RENAL PELVIS: Chronic | Status: ACTIVE | Noted: 2023-12-18

## 2023-12-30 PROBLEM — I10 ESSENTIAL (PRIMARY) HYPERTENSION: Chronic | Status: ACTIVE | Noted: 2023-12-18

## 2023-12-30 PROBLEM — I25.10 ATHEROSCLEROTIC HEART DISEASE OF NATIVE CORONARY ARTERY WITHOUT ANGINA PECTORIS: Chronic | Status: ACTIVE | Noted: 2023-12-18

## 2023-12-30 PROBLEM — Z87.891 PERSONAL HISTORY OF NICOTINE DEPENDENCE: Chronic | Status: ACTIVE | Noted: 2023-12-18

## 2023-12-30 PROBLEM — Z87.438 PERSONAL HISTORY OF OTHER DISEASES OF MALE GENITAL ORGANS: Chronic | Status: ACTIVE | Noted: 2023-12-18

## 2024-01-02 LAB
NON-GYNECOLOGICAL CYTOLOGY STUDY: SIGNIFICANT CHANGE UP
NON-GYNECOLOGICAL CYTOLOGY STUDY: SIGNIFICANT CHANGE UP

## 2024-01-09 ENCOUNTER — APPOINTMENT (OUTPATIENT)
Dept: CARDIOLOGY | Facility: CLINIC | Age: 64
End: 2024-01-09
Payer: MEDICARE

## 2024-01-09 VITALS
BODY MASS INDEX: 27.33 KG/M2 | OXYGEN SATURATION: 99 % | DIASTOLIC BLOOD PRESSURE: 74 MMHG | WEIGHT: 192 LBS | HEART RATE: 74 BPM | SYSTOLIC BLOOD PRESSURE: 127 MMHG | RESPIRATION RATE: 18 BRPM | TEMPERATURE: 97.7 F

## 2024-01-09 DIAGNOSIS — Z01.810 ENCOUNTER FOR PREPROCEDURAL CARDIOVASCULAR EXAMINATION: ICD-10-CM

## 2024-01-09 PROCEDURE — 99214 OFFICE O/P EST MOD 30 MIN: CPT

## 2024-01-09 PROCEDURE — G2211 COMPLEX E/M VISIT ADD ON: CPT

## 2024-01-10 ENCOUNTER — APPOINTMENT (OUTPATIENT)
Dept: CT IMAGING | Facility: IMAGING CENTER | Age: 64
End: 2024-01-10

## 2024-01-16 ENCOUNTER — NON-APPOINTMENT (OUTPATIENT)
Age: 64
End: 2024-01-16

## 2024-01-18 NOTE — ASU PATIENT PROFILE, ADULT - NSICDXPASTSURGICALHX_GEN_ALL_CORE_FT
PAST SURGICAL HISTORY:  H/O right nephrectomy     History of cardiac cath     History of percutaneous coronary intervention

## 2024-01-18 NOTE — ASU PATIENT PROFILE, ADULT - TELEPHONIC ID NUMBER OF THE INTERPRETER
-- DO NOT REPLY / DO NOT REPLY ALL --  -- Message is from Engagement Center Operations (EC[facility]) --    General Patient Message: Prakash is calling regarding sucralfate (CARAFATE) 1 GM/10ML suspension script and asking if alternative is okay. Prakash states this is very expensive and more affordable as tablets, made into a slurry. Nurses unavailable. Please call Prakash back to clarify.       Alternative phone number: no    Can a detailed message be left? Yes    Message Turnaround: [place]:    Refer to site's KB page for routing instructions    Please give this turnaround time to the caller:   \"You can expect to receive a response 2-3 business days after your provider's clinical team reviews the message\"              
Done - please inform patient.    Thank you.  Geeta Talavera MD  4/28/2023      
[id number]

## 2024-01-18 NOTE — ASU PATIENT PROFILE, ADULT - LANGUAGE ASSISTANCE NEEDED
Yes-Patient/Caregiver accepts free interpretation services... Patient requests  for wife discharge instruction/No-Patient/Caregiver offered and refused free interpretation services.

## 2024-01-18 NOTE — ASU PATIENT PROFILE, ADULT - NSICDXPASTMEDICALHX_GEN_ALL_CORE_FT
PAST MEDICAL HISTORY:  CAD (coronary artery disease)     Cancer of right kidney     Former smoker     H/O sleep apnea     History of BPH     History of nephrectomy right    HLD (hyperlipidemia)     HTN (hypertension)     Lung disorder

## 2024-01-21 ENCOUNTER — APPOINTMENT (OUTPATIENT)
Dept: NUCLEAR MEDICINE | Facility: IMAGING CENTER | Age: 64
End: 2024-01-21

## 2024-01-21 ENCOUNTER — APPOINTMENT (OUTPATIENT)
Dept: NUCLEAR MEDICINE | Facility: IMAGING CENTER | Age: 64
End: 2024-01-21
Payer: MEDICARE

## 2024-01-21 ENCOUNTER — OUTPATIENT (OUTPATIENT)
Dept: OUTPATIENT SERVICES | Facility: HOSPITAL | Age: 64
LOS: 1 days | End: 2024-01-21
Payer: MEDICARE

## 2024-01-21 DIAGNOSIS — C65.1 MALIGNANT NEOPLASM OF RIGHT RENAL PELVIS: ICD-10-CM

## 2024-01-21 DIAGNOSIS — Z90.5 ACQUIRED ABSENCE OF KIDNEY: Chronic | ICD-10-CM

## 2024-01-21 DIAGNOSIS — Z98.890 OTHER SPECIFIED POSTPROCEDURAL STATES: Chronic | ICD-10-CM

## 2024-01-21 DIAGNOSIS — Z98.61 CORONARY ANGIOPLASTY STATUS: Chronic | ICD-10-CM

## 2024-01-21 PROBLEM — Z86.69 PERSONAL HISTORY OF OTHER DISEASES OF THE NERVOUS SYSTEM AND SENSE ORGANS: Chronic | Status: ACTIVE | Noted: 2024-01-18

## 2024-01-21 PROCEDURE — 78815 PET IMAGE W/CT SKULL-THIGH: CPT

## 2024-01-21 PROCEDURE — 78815 PET IMAGE W/CT SKULL-THIGH: CPT | Mod: 26,PI

## 2024-01-21 PROCEDURE — A9552: CPT

## 2024-01-25 ENCOUNTER — APPOINTMENT (OUTPATIENT)
Dept: PULMONOLOGY | Facility: CLINIC | Age: 64
End: 2024-01-25

## 2024-02-01 ENCOUNTER — OUTPATIENT (OUTPATIENT)
Dept: OUTPATIENT SERVICES | Facility: HOSPITAL | Age: 64
LOS: 1 days | End: 2024-02-01
Payer: MEDICARE

## 2024-02-01 ENCOUNTER — TRANSCRIPTION ENCOUNTER (OUTPATIENT)
Age: 64
End: 2024-02-01

## 2024-02-01 VITALS
HEIGHT: 72 IN | HEART RATE: 65 BPM | RESPIRATION RATE: 15 BRPM | SYSTOLIC BLOOD PRESSURE: 138 MMHG | WEIGHT: 192.02 LBS | OXYGEN SATURATION: 98 % | DIASTOLIC BLOOD PRESSURE: 76 MMHG | TEMPERATURE: 98 F

## 2024-02-01 VITALS
DIASTOLIC BLOOD PRESSURE: 79 MMHG | HEART RATE: 70 BPM | OXYGEN SATURATION: 99 % | RESPIRATION RATE: 14 BRPM | SYSTOLIC BLOOD PRESSURE: 141 MMHG

## 2024-02-01 DIAGNOSIS — C65.1 MALIGNANT NEOPLASM OF RIGHT RENAL PELVIS: ICD-10-CM

## 2024-02-01 DIAGNOSIS — Z98.890 OTHER SPECIFIED POSTPROCEDURAL STATES: Chronic | ICD-10-CM

## 2024-02-01 DIAGNOSIS — Z90.5 ACQUIRED ABSENCE OF KIDNEY: Chronic | ICD-10-CM

## 2024-02-01 DIAGNOSIS — Z98.61 CORONARY ANGIOPLASTY STATUS: Chronic | ICD-10-CM

## 2024-02-01 PROCEDURE — 76937 US GUIDE VASCULAR ACCESS: CPT | Mod: 26

## 2024-02-01 PROCEDURE — 77001 FLUOROGUIDE FOR VEIN DEVICE: CPT | Mod: 26

## 2024-02-01 PROCEDURE — 36561 INSERT TUNNELED CV CATH: CPT | Mod: RT

## 2024-02-01 NOTE — ASU DISCHARGE PLAN (ADULT/PEDIATRIC) - NS MD DC FALL RISK RISK
For information on Fall & Injury Prevention, visit: [email or web address]-[email or web address]-mobility OR  [email or web address]://[email or web address]-[email or web address]-tips-[email or web address]-avoid-injury OR  https://www.cdc.gov/steadi/patient.html

## 2024-02-01 NOTE — H&P ADULT - HISTORY OF PRESENT ILLNESS
Interventional Radiology    HPI: 63y Male with history of clear cell RCC s/p nephrectomy 5/16/23, CKD, CAD with DAVION placed to 100% LCx  with residual 80% m/d RCA stenosis 6/5/23 who presents for f/u.  Presents to IR today for permcath placement.    Review of Systems:   Constitutional: No fever  Neurological: No headaches  Respiratory: No cough; No shortness of breath  Cardiovascular: No chest pain  Gastrointestinal: No abdominal or epigastric pain    Allergies: penicillin (Rash)    Medications (Abx/Cardiac/Anticoagulation/Blood Products)      Data:  182.9  87.09  T(C): 36.5  HR: 65  BP: 138/76  RR: 15  SpO2: 98%      Physical Exam  General: No acute distress, nontoxic, A&Ox3  Chest: Non labored breathing  Abdomen: Non-distended, non tender  Skin: No rashes or lesions    RADIOLOGY & ADDITIONAL TESTS:    Imaging Reviewed    H & P Note Reviewed from: __12/18/23_____    Plan: 63y Male presents for mediport placement  -Risks/Benefits/alternatives explained with the patient and/or healthcare proxy and witnessed informed consent obtained.

## 2024-02-01 NOTE — ASU DISCHARGE PLAN (ADULT/PEDIATRIC) - ASU DC SPECIAL INSTRUCTIONSFT
Chest Port Placement    Discharge Instructions  - You have had a chest port implanted in your chest.   - The port is ready for use.  - You may shower in 48 hours. No soaking or swimming for 2 weeks or until the site is completely healed.  - Keep the area covered and dry for the next 7 days. It may be removed by a chemotherapy nurse as needed for treatment.  - Do not perform any heavy lifting or put tension on the area for the next week or until the site is healed.  - You may resume your normal diet.  - You may resume your normal medications however you should wait 48 hours before restarting aspirin, plavix, or blood thinners.  - It is normal to experience some pain over the site for the next few days. You may take apply ice to the area (20 minutes on, 20 minutes off) and take Tylenol for that pain. Do not take more frequently than every 6 hours and do not exceed more than 3000mg of Tylenol in a 24 hour period.    - You were given conscious sedation which may make you drowsy, therefore you need someone to stay with you until the morning following the procedure.  - Do not drive, engage in heavy lifting or strenuous activity, or drink any alcoholic beverages for the next 24 hours.   - You may resume normal activity in 24 hours.    Notify your primary physician and/or Interventional Radiology IMMEDIATELY if you experience any of the following       - Fever of 100.4F or 38C       - Chills or Rigors/ Shakes       - Swelling and/or Redness in the area around the port       - Worsening Pain       - Blood soaked bandages or worsening bleeding       - Lightheadedness and/or dizziness upon standing       - Chest Pain/ Tightness       - Shortness of Breath       - Difficulty walking    If you have a problem that you believe requires IMMEDIATE attention, please go to your NEAREST Emergency Room. If you believe your problem can safely wait until you speak to a physician, please call Interventional Radiology for any concerns.    Please feel free to contact us at [phone] if any problems arise.

## 2024-02-05 PROCEDURE — 36561 INSERT TUNNELED CV CATH: CPT

## 2024-02-05 PROCEDURE — 76937 US GUIDE VASCULAR ACCESS: CPT

## 2024-02-05 PROCEDURE — 77001 FLUOROGUIDE FOR VEIN DEVICE: CPT

## 2024-02-05 PROCEDURE — C1894: CPT

## 2024-02-05 PROCEDURE — C1769: CPT

## 2024-02-05 PROCEDURE — C1788: CPT

## 2024-03-11 ENCOUNTER — APPOINTMENT (OUTPATIENT)
Dept: NUCLEAR MEDICINE | Facility: CLINIC | Age: 64
End: 2024-03-11
Payer: MEDICARE

## 2024-03-11 ENCOUNTER — OUTPATIENT (OUTPATIENT)
Dept: OUTPATIENT SERVICES | Facility: HOSPITAL | Age: 64
LOS: 1 days | End: 2024-03-11
Payer: MEDICARE

## 2024-03-11 DIAGNOSIS — Z98.61 CORONARY ANGIOPLASTY STATUS: Chronic | ICD-10-CM

## 2024-03-11 DIAGNOSIS — C65.1 MALIGNANT NEOPLASM OF RIGHT RENAL PELVIS: ICD-10-CM

## 2024-03-11 DIAGNOSIS — Z98.890 OTHER SPECIFIED POSTPROCEDURAL STATES: Chronic | ICD-10-CM

## 2024-03-11 DIAGNOSIS — R91.8 OTHER NONSPECIFIC ABNORMAL FINDING OF LUNG FIELD: ICD-10-CM

## 2024-03-11 DIAGNOSIS — Z90.5 ACQUIRED ABSENCE OF KIDNEY: Chronic | ICD-10-CM

## 2024-03-11 PROCEDURE — 78815 PET IMAGE W/CT SKULL-THIGH: CPT | Mod: 26,PI

## 2024-03-11 PROCEDURE — 78815 PET IMAGE W/CT SKULL-THIGH: CPT

## 2024-03-11 PROCEDURE — A9552: CPT

## 2024-03-17 ENCOUNTER — INPATIENT (INPATIENT)
Facility: HOSPITAL | Age: 64
LOS: 2 days | Discharge: HOME | DRG: 392 | End: 2024-03-20
Attending: STUDENT IN AN ORGANIZED HEALTH CARE EDUCATION/TRAINING PROGRAM | Admitting: STUDENT IN AN ORGANIZED HEALTH CARE EDUCATION/TRAINING PROGRAM
Payer: MEDICARE

## 2024-03-17 VITALS
TEMPERATURE: 97 F | HEIGHT: 72 IN | DIASTOLIC BLOOD PRESSURE: 79 MMHG | OXYGEN SATURATION: 98 % | WEIGHT: 182.1 LBS | HEART RATE: 94 BPM | RESPIRATION RATE: 17 BRPM | SYSTOLIC BLOOD PRESSURE: 129 MMHG

## 2024-03-17 DIAGNOSIS — Z98.61 CORONARY ANGIOPLASTY STATUS: Chronic | ICD-10-CM

## 2024-03-17 DIAGNOSIS — R19.7 DIARRHEA, UNSPECIFIED: ICD-10-CM

## 2024-03-17 DIAGNOSIS — I25.10 ATHEROSCLEROTIC HEART DISEASE OF NATIVE CORONARY ARTERY WITHOUT ANGINA PECTORIS: ICD-10-CM

## 2024-03-17 DIAGNOSIS — Z98.890 OTHER SPECIFIED POSTPROCEDURAL STATES: Chronic | ICD-10-CM

## 2024-03-17 DIAGNOSIS — Z29.9 ENCOUNTER FOR PROPHYLACTIC MEASURES, UNSPECIFIED: ICD-10-CM

## 2024-03-17 DIAGNOSIS — I10 ESSENTIAL (PRIMARY) HYPERTENSION: ICD-10-CM

## 2024-03-17 DIAGNOSIS — Z90.5 ACQUIRED ABSENCE OF KIDNEY: Chronic | ICD-10-CM

## 2024-03-17 DIAGNOSIS — N17.9 ACUTE KIDNEY FAILURE, UNSPECIFIED: ICD-10-CM

## 2024-03-17 DIAGNOSIS — C64.9 MALIGNANT NEOPLASM OF UNSPECIFIED KIDNEY, EXCEPT RENAL PELVIS: ICD-10-CM

## 2024-03-17 LAB
ALBUMIN SERPL ELPH-MCNC: 2.6 G/DL — LOW (ref 3.3–5)
ALP SERPL-CCNC: 55 U/L — SIGNIFICANT CHANGE UP (ref 40–120)
ALT FLD-CCNC: 63 U/L — SIGNIFICANT CHANGE UP (ref 12–78)
ANION GAP SERPL CALC-SCNC: 8 MMOL/L — SIGNIFICANT CHANGE UP (ref 5–17)
APPEARANCE UR: CLEAR — SIGNIFICANT CHANGE UP
APTT BLD: 31 SEC — SIGNIFICANT CHANGE UP (ref 24.5–35.6)
AST SERPL-CCNC: 34 U/L — SIGNIFICANT CHANGE UP (ref 15–37)
BASOPHILS # BLD AUTO: 0 K/UL — SIGNIFICANT CHANGE UP (ref 0–0.2)
BASOPHILS NFR BLD AUTO: 0 % — SIGNIFICANT CHANGE UP (ref 0–2)
BILIRUB SERPL-MCNC: 0.2 MG/DL — SIGNIFICANT CHANGE UP (ref 0.2–1.2)
BILIRUB UR-MCNC: NEGATIVE — SIGNIFICANT CHANGE UP
BUN SERPL-MCNC: 11 MG/DL — SIGNIFICANT CHANGE UP (ref 7–23)
CALCIUM SERPL-MCNC: 8 MG/DL — LOW (ref 8.5–10.1)
CHLORIDE SERPL-SCNC: 109 MMOL/L — HIGH (ref 96–108)
CO2 SERPL-SCNC: 24 MMOL/L — SIGNIFICANT CHANGE UP (ref 22–31)
COLOR SPEC: YELLOW — SIGNIFICANT CHANGE UP
CREAT SERPL-MCNC: 1.4 MG/DL — HIGH (ref 0.5–1.3)
DIFF PNL FLD: NEGATIVE — SIGNIFICANT CHANGE UP
EGFR: 56 ML/MIN/1.73M2 — LOW
EOSINOPHIL # BLD AUTO: 0 K/UL — SIGNIFICANT CHANGE UP (ref 0–0.5)
EOSINOPHIL NFR BLD AUTO: 0 % — SIGNIFICANT CHANGE UP (ref 0–6)
GI PCR PANEL: SIGNIFICANT CHANGE UP
GLUCOSE SERPL-MCNC: 116 MG/DL — HIGH (ref 70–99)
GLUCOSE UR QL: NEGATIVE MG/DL — SIGNIFICANT CHANGE UP
HCT VFR BLD CALC: 42.3 % — SIGNIFICANT CHANGE UP (ref 39–50)
HGB BLD-MCNC: 13.9 G/DL — SIGNIFICANT CHANGE UP (ref 13–17)
INR BLD: 0.99 RATIO — SIGNIFICANT CHANGE UP (ref 0.85–1.18)
KETONES UR-MCNC: NEGATIVE MG/DL — SIGNIFICANT CHANGE UP
LACTATE SERPL-SCNC: 1.7 MMOL/L — SIGNIFICANT CHANGE UP (ref 0.7–2)
LACTATE SERPL-SCNC: 2.8 MMOL/L — HIGH (ref 0.7–2)
LEUKOCYTE ESTERASE UR-ACNC: NEGATIVE — SIGNIFICANT CHANGE UP
LYMPHOCYTES # BLD AUTO: 0.83 K/UL — LOW (ref 1–3.3)
LYMPHOCYTES # BLD AUTO: 7 % — LOW (ref 13–44)
MCHC RBC-ENTMCNC: 29 PG — SIGNIFICANT CHANGE UP (ref 27–34)
MCHC RBC-ENTMCNC: 32.9 GM/DL — SIGNIFICANT CHANGE UP (ref 32–36)
MCV RBC AUTO: 88.3 FL — SIGNIFICANT CHANGE UP (ref 80–100)
MONOCYTES # BLD AUTO: 2.14 K/UL — HIGH (ref 0–0.9)
MONOCYTES NFR BLD AUTO: 18 % — HIGH (ref 2–14)
NEUTROPHILS # BLD AUTO: 7.84 K/UL — HIGH (ref 1.8–7.4)
NEUTROPHILS NFR BLD AUTO: 25 % — LOW (ref 43–77)
NITRITE UR-MCNC: NEGATIVE — SIGNIFICANT CHANGE UP
NRBC # BLD: SIGNIFICANT CHANGE UP /100 WBCS (ref 0–0)
PH UR: 6 — SIGNIFICANT CHANGE UP (ref 5–8)
PLATELET # BLD AUTO: 285 K/UL — SIGNIFICANT CHANGE UP (ref 150–400)
POTASSIUM SERPL-MCNC: 4.2 MMOL/L — SIGNIFICANT CHANGE UP (ref 3.5–5.3)
POTASSIUM SERPL-SCNC: 4.2 MMOL/L — SIGNIFICANT CHANGE UP (ref 3.5–5.3)
PROT SERPL-MCNC: 6.5 G/DL — SIGNIFICANT CHANGE UP (ref 6–8.3)
PROT UR-MCNC: SIGNIFICANT CHANGE UP MG/DL
PROTHROM AB SERPL-ACNC: 11.6 SEC — SIGNIFICANT CHANGE UP (ref 9.5–13)
RAPID RVP RESULT: SIGNIFICANT CHANGE UP
RBC # BLD: 4.79 M/UL — SIGNIFICANT CHANGE UP (ref 4.2–5.8)
RBC # FLD: 13.2 % — SIGNIFICANT CHANGE UP (ref 10.3–14.5)
SARS-COV-2 RNA SPEC QL NAA+PROBE: SIGNIFICANT CHANGE UP
SODIUM SERPL-SCNC: 141 MMOL/L — SIGNIFICANT CHANGE UP (ref 135–145)
SP GR SPEC: 1.01 — SIGNIFICANT CHANGE UP (ref 1–1.03)
TROPONIN I, HIGH SENSITIVITY RESULT: 7.6 NG/L — SIGNIFICANT CHANGE UP
UROBILINOGEN FLD QL: 0.2 MG/DL — SIGNIFICANT CHANGE UP (ref 0.2–1)
WBC # BLD: 11.88 K/UL — HIGH (ref 3.8–10.5)
WBC # FLD AUTO: 11.88 K/UL — HIGH (ref 3.8–10.5)

## 2024-03-17 PROCEDURE — 71045 X-RAY EXAM CHEST 1 VIEW: CPT | Mod: 26

## 2024-03-17 PROCEDURE — 99285 EMERGENCY DEPT VISIT HI MDM: CPT

## 2024-03-17 PROCEDURE — 93010 ELECTROCARDIOGRAM REPORT: CPT

## 2024-03-17 PROCEDURE — 99222 1ST HOSP IP/OBS MODERATE 55: CPT | Mod: GC

## 2024-03-17 PROCEDURE — 74176 CT ABD & PELVIS W/O CONTRAST: CPT | Mod: 26,MC

## 2024-03-17 RX ORDER — HEPARIN SODIUM 5000 [USP'U]/ML
5000 INJECTION INTRAVENOUS; SUBCUTANEOUS EVERY 12 HOURS
Refills: 0 | Status: DISCONTINUED | OUTPATIENT
Start: 2024-03-17 | End: 2024-03-20

## 2024-03-17 RX ORDER — LANOLIN ALCOHOL/MO/W.PET/CERES
3 CREAM (GRAM) TOPICAL AT BEDTIME
Refills: 0 | Status: DISCONTINUED | OUTPATIENT
Start: 2024-03-17 | End: 2024-03-20

## 2024-03-17 RX ORDER — ONDANSETRON 8 MG/1
4 TABLET, FILM COATED ORAL EVERY 8 HOURS
Refills: 0 | Status: DISCONTINUED | OUTPATIENT
Start: 2024-03-17 | End: 2024-03-20

## 2024-03-17 RX ORDER — CLOPIDOGREL BISULFATE 75 MG/1
75 TABLET, FILM COATED ORAL DAILY
Refills: 0 | Status: DISCONTINUED | OUTPATIENT
Start: 2024-03-17 | End: 2024-03-20

## 2024-03-17 RX ORDER — ASPIRIN/CALCIUM CARB/MAGNESIUM 324 MG
81 TABLET ORAL DAILY
Refills: 0 | Status: DISCONTINUED | OUTPATIENT
Start: 2024-03-17 | End: 2024-03-18

## 2024-03-17 RX ORDER — ACETAMINOPHEN 500 MG
1000 TABLET ORAL ONCE
Refills: 0 | Status: COMPLETED | OUTPATIENT
Start: 2024-03-17 | End: 2024-03-17

## 2024-03-17 RX ORDER — SODIUM CHLORIDE 9 MG/ML
1000 INJECTION INTRAMUSCULAR; INTRAVENOUS; SUBCUTANEOUS ONCE
Refills: 0 | Status: COMPLETED | OUTPATIENT
Start: 2024-03-17 | End: 2024-03-17

## 2024-03-17 RX ORDER — SODIUM CHLORIDE 9 MG/ML
1000 INJECTION INTRAMUSCULAR; INTRAVENOUS; SUBCUTANEOUS
Refills: 0 | Status: DISCONTINUED | OUTPATIENT
Start: 2024-03-17 | End: 2024-03-18

## 2024-03-17 RX ORDER — ERTAPENEM SODIUM 1 G/1
1000 INJECTION, POWDER, LYOPHILIZED, FOR SOLUTION INTRAMUSCULAR; INTRAVENOUS ONCE
Refills: 0 | Status: COMPLETED | OUTPATIENT
Start: 2024-03-17 | End: 2024-03-17

## 2024-03-17 RX ORDER — CLOPIDOGREL BISULFATE 75 MG/1
1 TABLET, FILM COATED ORAL
Refills: 0 | DISCHARGE

## 2024-03-17 RX ORDER — ASPIRIN/CALCIUM CARB/MAGNESIUM 324 MG
1 TABLET ORAL
Refills: 0 | DISCHARGE

## 2024-03-17 RX ORDER — TAMSULOSIN HYDROCHLORIDE 0.4 MG/1
1 CAPSULE ORAL
Refills: 0 | DISCHARGE

## 2024-03-17 RX ORDER — ACETAMINOPHEN 500 MG
650 TABLET ORAL EVERY 6 HOURS
Refills: 0 | Status: DISCONTINUED | OUTPATIENT
Start: 2024-03-17 | End: 2024-03-20

## 2024-03-17 RX ORDER — ROSUVASTATIN CALCIUM 5 MG/1
1 TABLET ORAL
Refills: 0 | DISCHARGE

## 2024-03-17 RX ORDER — MORPHINE SULFATE 50 MG/1
2 CAPSULE, EXTENDED RELEASE ORAL EVERY 4 HOURS
Refills: 0 | Status: DISCONTINUED | OUTPATIENT
Start: 2024-03-17 | End: 2024-03-19

## 2024-03-17 RX ORDER — ATORVASTATIN CALCIUM 80 MG/1
20 TABLET, FILM COATED ORAL AT BEDTIME
Refills: 0 | Status: DISCONTINUED | OUTPATIENT
Start: 2024-03-17 | End: 2024-03-20

## 2024-03-17 RX ADMIN — ATORVASTATIN CALCIUM 20 MILLIGRAM(S): 80 TABLET, FILM COATED ORAL at 22:49

## 2024-03-17 RX ADMIN — Medication 400 MILLIGRAM(S): at 18:48

## 2024-03-17 RX ADMIN — ERTAPENEM SODIUM 120 MILLIGRAM(S): 1 INJECTION, POWDER, LYOPHILIZED, FOR SOLUTION INTRAMUSCULAR; INTRAVENOUS at 17:23

## 2024-03-17 RX ADMIN — SODIUM CHLORIDE 80 MILLILITER(S): 9 INJECTION INTRAMUSCULAR; INTRAVENOUS; SUBCUTANEOUS at 20:44

## 2024-03-17 RX ADMIN — SODIUM CHLORIDE 1000 MILLILITER(S): 9 INJECTION INTRAMUSCULAR; INTRAVENOUS; SUBCUTANEOUS at 18:48

## 2024-03-17 RX ADMIN — SODIUM CHLORIDE 1000 MILLILITER(S): 9 INJECTION INTRAMUSCULAR; INTRAVENOUS; SUBCUTANEOUS at 16:16

## 2024-03-17 RX ADMIN — Medication 1000 MILLIGRAM(S): at 19:03

## 2024-03-17 NOTE — H&P ADULT - NSHPPHYSICALEXAM_GEN_ALL_CORE
T(C): 36.8 (03-17-24 @ 19:33), Max: 36.8 (03-17-24 @ 19:33)  HR: 90 (03-17-24 @ 19:33) (90 - 94)  BP: 143/72 (03-17-24 @ 19:33) (129/79 - 154/89)  RR: 18 (03-17-24 @ 19:33) (16 - 18)  SpO2: 97% (03-17-24 @ 19:33) (97% - 98%)    PHYSICAL EXAM:  GENERAL: NAD, lying in bed comfortably  HEAD:  Atraumatic, Normocephalic  EYES: EOMI, PERRLA, conjunctiva and sclera clear  ENT: Dry mucous membranes  NECK: Supple, No JVD  CHEST/LUNG: Clear to auscultation bilaterally; No rales, rhonchi, wheezing, or rubs. Unlabored respirations  HEART: Regular rate and rhythm; No murmurs, rubs, or gallops  ABDOMEN: Bowel sounds present; Soft, Nontender, Nondistended. No hepatomegally  EXTREMITIES:  2+ Peripheral Pulses, brisk capillary refill. No clubbing, cyanosis, or edema  NERVOUS SYSTEM:  Alert & Oriented X3, speech clear. No deficits   MSK: FROM all 4 extremities, full and equal strength  SKIN: No rashes or lesions

## 2024-03-17 NOTE — PATIENT PROFILE ADULT - NSPROHMSYMPCOND_GEN_A_NUR
1120 Dr Fierro notified of patient's B/P this AM. Received telephone order from Dr Fierro to modify PRN Clonidine order to say\" or\" instead of \"and\". 1158 PRN clonidine given for B/P of 145/103. Patient signed AMA request at 1145. Dr Fierro notified of AMA request at 1150. Patient remains calm and cooperative on the unit.   cancer/genitourinary

## 2024-03-17 NOTE — ED ADULT NURSE NOTE - NSFALLHARMRISKINTERV_ED_ALL_ED

## 2024-03-17 NOTE — ED PROVIDER NOTE - DIFFERENTIAL DIAGNOSIS
Differential Diagnosis Patient presenting to the emergency room with chief complaint of weakness diarrhea.  Differential includes but not limited to possible viral etiology versus bacterial colitis versus diverticulitis versus additional intra-abdominal pathology.  Patient is at higher risk as he is immunocompromise.  Will obtain screening septic workup send stool for Studies the patient able to have bowel movement obtain CT imaging of the abdomen pelvis EKG hydrate medicate as needed for symptoms and monitor.  Patient will likely require admission for further workup and evaluation.  Will begin antibiotics as needed.  This patient is also complaining of weakness generalized will obtain EKG and troponin to rule out cardiac pathology.

## 2024-03-17 NOTE — ED ADULT NURSE NOTE - OBJECTIVE STATEMENT
Pt is AOX4, came from home to the ED with c/o ABD pain, lethargy, n/v/d for 8 days, SOB. Pt states he is receiving CA (pulmonary and renal CA) tx with MD Montemayor, last tx 3 weeks ago. Denies chills/body aches/fevers. Pt has c/o ABD pain rated 6 out of 10. Pt does have hxHTN, cardiac stent, Pt is on plavix. CM/ in place. Pending lab and radiology orders. care ongoing.

## 2024-03-17 NOTE — ED PROVIDER NOTE - OBJECTIVE STATEMENT
63 M hx former smoker, cancer right kidney s/p nephrectomy (in [place]), cad s/p stents (on asa/plavix), bph, htn, hld c/o diarrhea for the past 8 days leading to feeling dehydrated, abd discomfort. Decreased oral intake, feeling very week. Has right chest port from last month. Receives immunotherapy chemo (total 3 doses, last was 3 weeks ago, next dose due this upcoming tuesday). His pmd prescribed him cipro/flagyl for symptoms, started 4 days ago, no help.     pmd kevin ([place])  heme onc Hon Montemayor  nephrologist in [place]

## 2024-03-17 NOTE — H&P ADULT - PROBLEM SELECTOR PLAN 2
Patient with kidney cancer s/p right nephrectomy in [facility] in 2023  Patient is on immunotherapy, received a total of 3 doses, last dose 3 weeks ago, next dose scheduled for 3/19   - Heme Onc Dr. Montemayor consulted, appreciate recs

## 2024-03-17 NOTE — ED PROVIDER NOTE - CLINICAL SUMMARY MEDICAL DECISION MAKING FREE TEXT BOX
Patient is a 63-year-old male who presents to the emergency room with a chief complaint of weakness.  Past medical history of renal cancer of the right kidney status post nephrectomy CAD status post stents BPH hypertension hyperlipidemia.  Patient is a former smoker.  Reports he has been experiencing diarrhea for the last 8 days and is now feeling weak and dehydrated.  Further noting abdominal discomfort decreased oral intake.  Currently receiving immuno chemotherapy last dose 3 weeks ago has a next dose upcoming this Tuesday.  He had spoken with his PMD regarding symptoms and was prescribed Cipro and Flagyl which began 4 days ago but has not helped with any of his symptoms.  Patient reports he is unable to keep anything down at this point.  On exam patient is lying in bed appears frail normocephalic atraumatic pupils are equal round and reactive hearts regular rate lungs clear to auscultation abdomen soft with mild tenderness to palpation in all quadrants although no guarding or rebound positive bowel sounds noted.  Patient presenting to the emergency room with chief complaint of weakness diarrhea.  Differential includes but not limited to possible viral etiology versus bacterial colitis versus diverticulitis versus additional intra-abdominal pathology.  Patient is at higher risk as he is immunocompromise.  Will obtain screening septic workup send stool for Studies the patient able to have bowel movement obtain CT imaging of the abdomen pelvis EKG hydrate medicate as needed for symptoms and monitor.  Patient will likely require admission for further workup and evaluation.  Will begin antibiotics as needed.  This patient is also complaining of weakness generalized will obtain EKG and troponin to rule out cardiac pathology.  Independent review of chest x-ray reveals no acute infiltrate.  Independent review of EKG reveals a normal sinus rhythm at 91 bpm.  Patient signed out pending CT and admission

## 2024-03-17 NOTE — H&P ADULT - PROBLEM SELECTOR PLAN 3
BUN/Cr= 11/1.4; baseline unclear   - PUNEET likely secondary to dehydration d/t diarrhea and poor PO intake   - Continue IV fluids  - F/u AM labs

## 2024-03-17 NOTE — ED ADULT TRIAGE NOTE - CHIEF COMPLAINT QUOTE
Hx Renal CA with mets to Lung.  Currently recieving chemo.  Increased weakness.  N/V/D.  Dr Montemayor Hem/Onc

## 2024-03-17 NOTE — H&P ADULT - NSHPSOCIALHISTORY_GEN_ALL_CORE
Smoking: past smoker of 45 years, quit 1 year ago   Alcohol: quit alcohol use  Denies recreational drug use   Lives with wife  Independent with ADLs

## 2024-03-17 NOTE — H&P ADULT - ATTENDING COMMENTS
63 M hx former smoker, cancer right kidney s/p nephrectomy (in [place]), cad s/p stents (on asa/plavix), bph, htn, hld c/o diarrhea for the past 8 days leading to feeling dehydrated along with abd discomfort.   agree with resident note.  pt with elevated WBC and 41% bands on immunotherapy for cancer treatment and with diarrhea x8 days, lactate elevated now WNL  defer abx for now, f/u ID recs, f/u stool studies and CDiff, continue IV fluids  f/u heme/onc Dr. Montemayor  f/u ID recs

## 2024-03-17 NOTE — H&P ADULT - ASSESSMENT
63 M hx former smoker, cancer right kidney s/p nephrectomy (in [place]), cad s/p stents (on asa/plavix), bph, htn, hld c/o diarrhea for the past 8 days leading to feeling dehydrated along with abd discomfort.

## 2024-03-17 NOTE — ED ADULT TRIAGE NOTE - BEFAST SPEECH SLURRED
Dr. Meneses's Healthy Eyes tips, 2020 version:    • Have routine eye exams.  Serious eye conditions like glaucoma have no symptoms until they are quite advanced.  Routine eyecare helps keep you seeing well & keeps your eyes healthy... and we love to see you every year!  If your eyes are red, sore, or your vision is blurry, & it's worsening or doesn't clear up on its own within a few days, come in for evaluation.  And we really like figuring out which is better for you, #1 or #2...!  ;)    • Be safe!  Wear safety glasses when doing ANY hazardous activities, including sawing, grinding, mowing the lawn, or working with hazardous chemicals.  If you ever DO get a foreign object or chemical in the eye, immediately flush it with sterile saline (preferably) or water and seek medical treatment immediately, especially in the case of a chemical splash or metal foreign object.  The longer a piece of metal remains, the more likely it is to rust in the eye.  Then I will need to use a tiny drill to remove the rusted tissue.  You don't want to need that.    • Wear sunglasses, even on cloudy days, to help reduce the risk of future cataracts, macular degeneration, & skin cancer around the eyes.  Good practice for adults & kids alike!  And... they look cool, so that's an added bonus.    • Contact lens wearers:  Do not continue to wear lenses that are causing eye redness, discomfort or blurry vision.  Remove them and call the office if the condition does not clear up.  Here's my common sense contact lens rule:  Your eyes should always LOOK good (white, not red), FEEL good (comfortable, not irritating... you should be able to forget they are even in), and your vision should be CLEAR.  Replace them on schedule & don't sleep with them in unless you were told this was okay.  Leave your case open to air dry after putting them in.  This is a great way to prevent bacterial growth in a case.  Replace cases routinely.    • Computer  users:  Position the screen so that your eyes are looking down about 20-30 degrees from the horizontal.  Take frequent breaks.  Use the 20-20-20 rule:  Take a 20-second break every 20 minutes and close your eyes or look at something at least 20 feet away.  If you spend a lot of time on the computer, consider getting specially made computer glasses.  You CAN use no-line multifocals at the computer but you might need to adjust the positioning of the screen (generally lower), or your glasses (generally higher), so you are looking at the screen through the right part of the lens.  Your blink rate tends to go down and your eyes might get dry during screen time so consider using a lubricant eyedrop (also called artificial tears) as needed.    • To help maintain a healthy tear film and slow the development of cataracts and macular degeneration, consider:   -Omega 3 (fish or flaxseed oil) supplements   -multivitamin containing vitamin E, zinc, lutein, & zeaxanthin   -increasing consumption of leafy greens.  Mom was right:  eat your spinach!  Kale too!   -don't smoke!  You know it causes lung cancer, but it also increases the risk of these eye problems.  Don't do it!  If you smoke now, we have smoking cessation materials to give you.    • They say the eyes are the window to the soul... yep, we see right in there... and we can read your mind.  Nope!  Just kidding on that one.  :)    • If you have any concerns regarding your eyes, vision, glasses or contact lenses, please call me at the [facility], [phone].  It's 2020... we want to help you see that way!       Mary Meneses, OD     No

## 2024-03-17 NOTE — ED PROVIDER NOTE - PROGRESS NOTE DETAILS
spoke with dr rodriguez - states dr shanks will be rounding tomorrow and she will let him know about patient

## 2024-03-17 NOTE — ED PROVIDER NOTE - WHICH SHOWED
Independent review of chest x-ray reveals no acute infiltrate.  Independent review of EKG reveals a normal sinus rhythm at 91 bpm.

## 2024-03-17 NOTE — H&P ADULT - HISTORY OF PRESENT ILLNESS
63 M hx former smoker, cancer right kidney s/p nephrectomy (in [place]), cad s/p stents (on asa/plavix), bph, htn, hld c/o diarrhea for the past 8 days leading to feeling dehydrated along with abd discomfort. He also reports some weight loss. He states that he has also been nauseous and had a few episodes of emesis. He states that he last vomited this afternoon. His last episode of diarrhea was around 7 pm. Patient reports feeling better receiving IV fluids. His PMD prescribed Cipro and Flagyl which has provided no relief. Patient has right chest port from last month. He receives immunotherapy chemo (total 3 doses, last was 3 weeks ago, next dose due this upcoming Tuesday). He denies fever, cp, sob, constipation, and sick contacts. He reports chills, abd pain, N/V and diarrhea.   Wife present at bedside.     ED course:   Vitals: T 97.8 HR 90 /89  Labs: wbc 11.88 Lactate 2.8--> 1.7   Imaging:   CT abd/pelvis: *  No acute pathology, Additional stable findings as described above.  PET: 1.  Diffuse hypermetabolic uptake at thickened duodenum. Correlate   clinically. Repeat MRI of the abdomen may be helpful.  2.  Focus of increased uptake in the RIGHT paravertebral musculature   posterior to the lumbar spine is nonspecific; MRI may be obtained for   further assessment.  3.  Improved size and activity at lymph nodes in the chest and pulmonary   nodules.  4.  Improved size and activity at previously seen destructive anterior   RIGHT iliac bone lesion.  5.  No distinct abnormal uptake at RIGHT nephrectomy site.  Given IV bolus x3, Ertapenem x1      63 M hx former smoker, cancer right kidney s/p nephrectomy (in [place]), cad s/p stents (on asa/plavix), bph, htn, hld c/o diarrhea for the past 8 days leading to feeling dehydrated along with abd discomfort. He also reports some weight loss. He states that he has also been nauseous and had a few episodes of emesis. He states that he last vomited this afternoon. His last episode of diarrhea was around 7 pm. Patient reports feeling better receiving IV fluids. His PMD prescribed Cipro and Flagyl which has provided no relief. Patient has right chest port. He receives immunotherapy chemo (total 3 doses, last was 3 weeks ago, next dose due this upcoming Tuesday). He denies fever, cp, sob, constipation, and sick contacts. He reports chills, abd pain, N/V and diarrhea.   Wife present at bedside.     ED course:   Vitals: T 97.8 HR 90 /89  Labs: wbc 11.88 Lactate 2.8--> 1.7   Imaging:   CT abd/pelvis: *  No acute pathology, Additional stable findings as described above.  PET: 1.  Diffuse hypermetabolic uptake at thickened duodenum. Correlate   clinically. Repeat MRI of the abdomen may be helpful.  2.  Focus of increased uptake in the RIGHT paravertebral musculature   posterior to the lumbar spine is nonspecific; MRI may be obtained for   further assessment.  3.  Improved size and activity at lymph nodes in the chest and pulmonary   nodules.  4.  Improved size and activity at previously seen destructive anterior   RIGHT iliac bone lesion.  5.  No distinct abnormal uptake at RIGHT nephrectomy site.  Given IV bolus x3, Ertapenem x1

## 2024-03-17 NOTE — H&P ADULT - PROBLEM SELECTOR PLAN 1
Patient presents with 8 day hx of diarrhea, poor PO intake, weakness, and fatigue   - S/P IV bolus x 3 and Ertapenem in Ed   - Lactate improved   - VSS   - CT abdomen with no acute pathology   - Continue maintenance fluids at 80 cc / hr   - F/u AM labs  - Will monitor off of abx for now, hold Cipro/Flagyll   - Patient presents with 8 day hx of diarrhea, poor PO intake, weakness, and fatigue   - S/P IV bolus x 3 and Ertapenem in Ed   - Lactate improved   - VSS   - CT abdomen with no acute pathology   - Continue maintenance fluids at 80 cc / hr   - F/u AM labs  - Will monitor off of abx for now, hold Cipro/Flagyll   - F/u stool cultures and C.diff PCR  - Continue isolation Patient presents with 8 day hx of diarrhea, poor PO intake, weakness, and fatigue   - S/P IV bolus x 3 and Ertapenem in Ed   - Lactate improved   - VSS   - CT abdomen with no acute pathology   - Continue maintenance fluids at 80 cc / hr   - F/u AM labs  - Will monitor off of abx for now, hold Cipro/Flagyll   - F/u stool cultures and C.diff PCR  - Full liquid diet   - Morphine PRN for abdominal pain   - ID consulted Dr. Wilson   - Continue isolation

## 2024-03-17 NOTE — H&P ADULT - NSHPREVIEWOFSYSTEMS_GEN_ALL_CORE
REVIEW OF SYSTEMS:    CONSTITUTIONAL: +weakness, chills  EYES/ENT:  No visual changes;  No vertigo or throat pain   NECK:  No pain or stiffness  RESPIRATORY:  No cough, wheezing, hemoptysis; No shortness of breath  CARDIOVASCULAR:  No chest pain or palpitations  GASTROINTESTINAL:  +diffuse abd pain, +N/V   GENITOURINARY:  No dysuria, frequency or hematuria  NEUROLOGICAL:  No numbness or weakness  SKIN:  No itching, rashes

## 2024-03-17 NOTE — ED ADULT TRIAGE NOTE - BIRTH SEX
[address] FETAL MEDICINE  [address] [facility] [address] [phone]   [address] FETAL MEDICINE  [address] [facility] [place] [address]  Dept: [phone]  Loc: [phone]     10/11/2022    RE:  Sedrick Sarkar     : 1993   AGE: 30 y.o. Dear Dr. Duncan Cali,    Thank you for allowing me to see Sedrick Sarkar. As I'm sure you will recall, Sedrick Sarkar is a 30 y.o. [id number]'s last menstrual period was 02/10/2022.  Estimated Date of Delivery: 22 at 34w5d seen in our office today for the following:    REASON FOR VISIT: NST    Patient Active Problem List    Diagnosis Date Noted    34 weeks gestation of pregnancy 10/07/2022    Third pregnancy 10/07/2022    BMI 30.0-30.9,adult 10/03/2022    Poor fetal growth affecting management of mother in third trimester 2022    Maternal congenital cardiac anomaly affecting pregnancy, antepartum, third trimester 2022    Uterine fibroid complicating  care, baby not yet delivered 2022    Alpha thalassemia silent carrier 2022    Right ovarian cyst 2022    Marginal insertion of umbilical cord affecting management of mother 2022    Proteinuria 2022    Low folate 2022    Maternal exposure to alcohol in first trimester 2022    Vitamin D deficiency 2022    Low vitamin D level 2022    Heterozygous MTHFR mutation [id number] 2022    Nausea/vomiting in pregnancy 2022    History of UTI 2022    History of thrombosis 2022    Protein S deficiency affecting pregnancy ([address] 2022    Acute cerebrovascular accident (CVA) ([address] 2022    Cerebrovascular accident (CVA) ([facility] 2022    Sprain of shoulder, left 2020    Cervicalgia 2020    Cervical spondylolysis 2020    Cervical strain 2020    Sprain of ligaments of cervical spine 2020        PAST HISTORY:  OB History    Para Term  AB Living   3 1 1   1 1   SAB IAB Ectopic Molar Multiple Live Births     1       1      # Outcome Date GA Lbr Epi/2nd Weight Sex Delivery Anes PTL Lv   3 Current            2 Term 02/10/09 40w0d  6 lb 11 oz (3.033 kg) M CS-LTranv Spinal  EMPERATRIZ      Complications: Failure to Progress in First Stage   1 IAB                   MEDICAL:  Past Medical History:   Diagnosis Date    Back pain     CVA (cerebral vascular accident) ([facility] 11/15/2021    Stroke 2021, 21, and 22 during pregnancy    Heterozygous for MTHFR gene mutation 2022    Neck pain     Protein S deficiency affecting pregnancy ([facility] 2022        SURGICAL:  Past Surgical History:   Procedure Laterality Date     SECTION      DILATION AND CURETTAGE      NERVE BLOCK Left 2020    LEFT CERVICAL MEDIAL BRANCH NERVE  BLOCK UNDER FLUOROSCOPIC GUIDANCE C3, C4, C5 AND C6 performed by Suman Wells MD at [address]:    No Known Allergies      MEDICATIONS:    Current Outpatient Medications   Medication Sig Dispense Refill    enoxaparin (LOVENOX) 300 MG/3ML injection Inject 70 mg into the skin in the morning and 70 mg before bedtime. folic acid (FOLVITE) 1 MG tablet Take 1 tablet by mouth daily 30 tablet 5    Magnesium Oxide 200 MG TABS Take 200 mg by mouth daily 30 tablet 4    vitamin B-6 (PYRIDOXINE) 100 MG tablet Take 100 mg by mouth 3 times daily as needed (FOR NAUSEA)      vitamin D3 (CHOLECALCIFEROL) 25 MCG (1000 UT) TABS tablet Take 2 tablets by mouth daily 90 tablet 2    aspirin 81 MG chewable tablet Take 1 tablet by mouth daily 90 tablet 3    Prenatal MV-Min-Fe Fum-FA-DHA (PRENATAL 1 PO) Take 1 tablet by mouth daily        No current facility-administered medications for this visit.         Social History     Socioeconomic History    Marital status: Single   Tobacco Use    Smoking status: Every Day     Packs/day: 0.50     Types: Cigarettes, Cigars    Smokeless tobacco: Never    Tobacco comments:     black and mild   Vaping Use    Vaping Use: Never used   Substance and Sexual Activity    Alcohol use: No    Drug use: Yes     Types: Marijuana Diana Cotton)    Sexual activity: Yes     Partners: Male          FAMILY MEDICAL HISTORY:   Family History   Problem Relation Age of Onset    Pulmonary Embolism Mother 48        After Hysterectomy    Asthma Father     Asthma Brother     Asthma Brother               PHYSICAL EXAMINATION:  /74   Pulse 99   Wt 166 lb (75.3 kg)   LMP 02/10/2022   Body mass index is 30.36 kg/m². Urine dipstick:  No results found for this visit on 10/11/22. INTERPRETATION:   The NST was reactive        Discussion:  The results were shared with the patient. IMPRESSION:  1. Single intrauterine gestation at 34+ weeks with  reactive NST. RECOMMENDATIONS:  Each of the recommendations were discussed with the patient:  1. Continue antepartum testing as indicated. 2. Additional testing is not indicated for today's visit. The patient is to continue to follow with you in your office for ongoing obstetric care. PLAN:    As noted above or sooner prn.     Sincerely,        Jayashree Do MD Male

## 2024-03-18 LAB
ALBUMIN SERPL ELPH-MCNC: 2 G/DL — LOW (ref 3.3–5)
ALP SERPL-CCNC: 44 U/L — SIGNIFICANT CHANGE UP (ref 40–120)
ALT FLD-CCNC: 67 U/L — SIGNIFICANT CHANGE UP (ref 12–78)
ANION GAP SERPL CALC-SCNC: 7 MMOL/L — SIGNIFICANT CHANGE UP (ref 5–17)
AST SERPL-CCNC: 44 U/L — HIGH (ref 15–37)
BILIRUB SERPL-MCNC: 0.2 MG/DL — SIGNIFICANT CHANGE UP (ref 0.2–1.2)
BUN SERPL-MCNC: 9 MG/DL — SIGNIFICANT CHANGE UP (ref 7–23)
C DIFF BY PCR RESULT: SIGNIFICANT CHANGE UP
CALCIUM SERPL-MCNC: 7.4 MG/DL — LOW (ref 8.5–10.1)
CHLORIDE SERPL-SCNC: 119 MMOL/L — HIGH (ref 96–108)
CO2 SERPL-SCNC: 20 MMOL/L — LOW (ref 22–31)
CREAT SERPL-MCNC: 1.2 MG/DL — SIGNIFICANT CHANGE UP (ref 0.5–1.3)
CULTURE RESULTS: NO GROWTH — SIGNIFICANT CHANGE UP
EGFR: 67 ML/MIN/1.73M2 — SIGNIFICANT CHANGE UP
GLUCOSE SERPL-MCNC: 80 MG/DL — SIGNIFICANT CHANGE UP (ref 70–99)
HCT VFR BLD CALC: 35.1 % — LOW (ref 39–50)
HCV AB S/CO SERPL IA: 0.09 S/CO — SIGNIFICANT CHANGE UP (ref 0–0.99)
HCV AB SERPL-IMP: SIGNIFICANT CHANGE UP
HGB BLD-MCNC: 11.5 G/DL — LOW (ref 13–17)
MCHC RBC-ENTMCNC: 28.9 PG — SIGNIFICANT CHANGE UP (ref 27–34)
MCHC RBC-ENTMCNC: 32.8 GM/DL — SIGNIFICANT CHANGE UP (ref 32–36)
MCV RBC AUTO: 88.2 FL — SIGNIFICANT CHANGE UP (ref 80–100)
NRBC # BLD: 0 /100 WBCS — SIGNIFICANT CHANGE UP (ref 0–0)
PLATELET # BLD AUTO: 248 K/UL — SIGNIFICANT CHANGE UP (ref 150–400)
POTASSIUM SERPL-MCNC: 4 MMOL/L — SIGNIFICANT CHANGE UP (ref 3.5–5.3)
POTASSIUM SERPL-SCNC: 4 MMOL/L — SIGNIFICANT CHANGE UP (ref 3.5–5.3)
PROT SERPL-MCNC: 5.1 G/DL — LOW (ref 6–8.3)
RBC # BLD: 3.98 M/UL — LOW (ref 4.2–5.8)
RBC # FLD: 13.3 % — SIGNIFICANT CHANGE UP (ref 10.3–14.5)
SODIUM SERPL-SCNC: 146 MMOL/L — HIGH (ref 135–145)
SPECIMEN SOURCE: SIGNIFICANT CHANGE UP
WBC # BLD: 11 K/UL — HIGH (ref 3.8–10.5)
WBC # FLD AUTO: 11 K/UL — HIGH (ref 3.8–10.5)

## 2024-03-18 PROCEDURE — 99222 1ST HOSP IP/OBS MODERATE 55: CPT

## 2024-03-18 PROCEDURE — 99233 SBSQ HOSP IP/OBS HIGH 50: CPT

## 2024-03-18 RX ORDER — FAMOTIDINE 10 MG/ML
20 INJECTION INTRAVENOUS DAILY
Refills: 0 | Status: DISCONTINUED | OUTPATIENT
Start: 2024-03-18 | End: 2024-03-20

## 2024-03-18 RX ORDER — FAMOTIDINE 10 MG/ML
20 INJECTION INTRAVENOUS DAILY
Refills: 0 | Status: DISCONTINUED | OUTPATIENT
Start: 2024-03-18 | End: 2024-03-18

## 2024-03-18 RX ORDER — ASPIRIN/CALCIUM CARB/MAGNESIUM 324 MG
81 TABLET ORAL DAILY
Refills: 0 | Status: DISCONTINUED | OUTPATIENT
Start: 2024-03-18 | End: 2024-03-20

## 2024-03-18 RX ORDER — SODIUM CHLORIDE 9 MG/ML
1000 INJECTION, SOLUTION INTRAVENOUS
Refills: 0 | Status: DISCONTINUED | OUTPATIENT
Start: 2024-03-18 | End: 2024-03-20

## 2024-03-18 RX ADMIN — Medication 81 MILLIGRAM(S): at 11:35

## 2024-03-18 RX ADMIN — HEPARIN SODIUM 5000 UNIT(S): 5000 INJECTION INTRAVENOUS; SUBCUTANEOUS at 06:01

## 2024-03-18 RX ADMIN — FAMOTIDINE 20 MILLIGRAM(S): 10 INJECTION INTRAVENOUS at 12:52

## 2024-03-18 RX ADMIN — CLOPIDOGREL BISULFATE 75 MILLIGRAM(S): 75 TABLET, FILM COATED ORAL at 11:35

## 2024-03-18 RX ADMIN — SODIUM CHLORIDE 80 MILLILITER(S): 9 INJECTION, SOLUTION INTRAVENOUS at 09:20

## 2024-03-18 RX ADMIN — MORPHINE SULFATE 2 MILLIGRAM(S): 50 CAPSULE, EXTENDED RELEASE ORAL at 06:01

## 2024-03-18 RX ADMIN — HEPARIN SODIUM 5000 UNIT(S): 5000 INJECTION INTRAVENOUS; SUBCUTANEOUS at 17:43

## 2024-03-18 RX ADMIN — MORPHINE SULFATE 2 MILLIGRAM(S): 50 CAPSULE, EXTENDED RELEASE ORAL at 07:00

## 2024-03-18 RX ADMIN — ATORVASTATIN CALCIUM 20 MILLIGRAM(S): 80 TABLET, FILM COATED ORAL at 21:31

## 2024-03-18 NOTE — CARE COORDINATION ASSESSMENT. - OTHER PERTINENT DISCHARGE PLANNING INFORMATION:
Met with patient at bedside to discuss the role of case management with verbalized understanding.  Needs unclear at present.  Patient presents with diarrhea and is currently r/o cdiff.  Will monitor for transition needs and remain available.  (4) no impairment

## 2024-03-18 NOTE — CONSULT NOTE ADULT - SUBJECTIVE AND OBJECTIVE BOX
INCOMPLETE NOTE.  Documentation in Progress  PT SEEN AND EVALUATED.   FULL/ADDITIONAL RECOMMENDATIONS TO FOLLOW   ***************************************************************    Patient is a 63y old  Male who presents with a chief complaint of diarrhea/weakness (18 Mar 2024 14:46)      HPI:  63 M hx former smoker, cancer right kidney s/p nephrectomy (in [place]), cad s/p stents (on asa/plavix), bph, htn, hld c/o diarrhea for the past 8 days leading to feeling dehydrated along with abd discomfort. He also reports some weight loss. He states that he has also been nauseous and had a few episodes of emesis. He states that he last vomited this afternoon. His last episode of diarrhea was around 7 pm. Patient reports feeling better receiving IV fluids. His PMD prescribed Cipro and Flagyl which has provided no relief. Patient has right chest port. He receives immunotherapy chemo (total 3 doses, last was 3 weeks ago, next dose due this upcoming Tuesday). He denies fever, cp, sob, constipation, and sick contacts. He reports chills, abd pain, N/V and diarrhea.   Wife present at bedside.     ED course:   Vitals: T 97.8 HR 90 /89  Labs: wbc 11.88 Lactate 2.8--> 1.7   Imaging:   CT abd/pelvis: *  No acute pathology, Additional stable findings as described above.  PET: 1.  Diffuse hypermetabolic uptake at thickened duodenum. Correlate   clinically. Repeat MRI of the abdomen may be helpful.  2.  Focus of increased uptake in the RIGHT paravertebral musculature   posterior to the lumbar spine is nonspecific; MRI may be obtained for   further assessment.  3.  Improved size and activity at lymph nodes in the chest and pulmonary   nodules.  4.  Improved size and activity at previously seen destructive anterior   RIGHT iliac bone lesion.  5.  No distinct abnormal uptake at RIGHT nephrectomy site.  Given IV bolus x3, Ertapenem x1      (17 Mar 2024 20:03)      PAST MEDICAL & SURGICAL HISTORY:  Lung disorder      HTN (hypertension)      CAD (coronary artery disease)      Former smoker      HLD (hyperlipidemia)      History of BPH      Cancer of right kidney      History of nephrectomy  right      H/O sleep apnea      History of cardiac cath      H/O right nephrectomy      History of percutaneous coronary intervention         HEALTH ISSUES - PROBLEM Dx:  Diarrhea    Cancer of kidney    PUNEET (acute kidney injury)    HTN (hypertension)    CAD (coronary artery disease)    Encounter for deep vein thrombosis (DVT) prophylaxis          Diarrhea [R19.7]    Lung disorder [J98.4]    HTN (hypertension) [I10]    CAD (coronary artery disease) [I25.10]    Former smoker [Z87.891]    HLD (hyperlipidemia) [E78.5]    History of BPH [Z87.438]    Cancer of right kidney [C64.1]    History of nephrectomy [Z90.5]    Asthma [J45.909]    H/O sleep apnea [Z86.69]    History of cardiac cath [Z98.890]    H/O right nephrectomy [Z90.5]    History of percutaneous coronary intervention [Z98.61]    Weakness [R53.1]    Bandemia [D72.825]      FAMILY HISTORY:  FH: HTN (hypertension) (Mother)    FH: type 2 diabetes (Father)        [SOCIAL HISTORY: ]     smoking:  none currently     EtOH:  none currently     illicit drugs:  none currently     occupation:  Retired     marital status:       Other:       [ALLERGIES/INTOLERANCES:]  Allergies    penicillin (Rash)    Intolerances        [MEDICATIONS]  MEDICATIONS  (STANDING):  aspirin enteric coated 81 milliGRAM(s) Oral daily  atorvastatin 20 milliGRAM(s) Oral at bedtime  clopidogrel Tablet 75 milliGRAM(s) Oral daily  famotidine Injectable 20 milliGRAM(s) IV Push daily  heparin   Injectable 5000 Unit(s) SubCutaneous every 12 hours  lactated ringers. 1000 milliLiter(s) (80 mL/Hr) IV Continuous <Continuous>    MEDICATIONS  (PRN):  acetaminophen     Tablet .. 650 milliGRAM(s) Oral every 6 hours PRN Temp greater or equal to 38C (100.4F), Mild Pain (1 - 3)  aluminum hydroxide/magnesium hydroxide/simethicone Suspension 30 milliLiter(s) Oral every 4 hours PRN Dyspepsia  melatonin 3 milliGRAM(s) Oral at bedtime PRN Insomnia  morphine  - Injectable 2 milliGRAM(s) IV Push every 4 hours PRN Moderate Pain (4 - 6)  ondansetron Injectable 4 milliGRAM(s) IV Push every 8 hours PRN Nausea and/or Vomiting      [REVIEW OF SYSTEMS: ]  CONSTITUTIONAL: normal, no fever, no shakes, no chills   EYES: No eye pain, no visual disturbances, no discharge  ENMT:  no discharge  NECK: No pain, no stiffness  BREASTS: No pain, no masses, no nipple discharge  RESPIRATORY: No cough, no wheezing, no chills, no hemoptysis; No shortness of breath  CARDIOVASCULAR: No chest pain, no palpitations, no dizziness, no leg swelling  GASTROINTESTINAL: No abdominal, no epigastric pain. No nausea, no vomiting, no hematemesis; No diarrhea , no constipation. No melena, no hematochezia.  GENITOURINARY: No dysuria, no frequency, no hematuria, no incontinence  NEUROLOGICAL: No headaches, no memory loss, no loss of strength, no numbness, no tremors  SKIN: No itching, no burning, no rashes, no lesions   LYMPH NODES: No enlarged glands  ENDOCRINE: No heat or cold intolerance; No hair loss  MUSCULOSKELETAL: No joint pain or swelling; No muscle, no back, no extremity pain  PSYCHIATRIC: No depression, no anxiety, no mood swings, no difficulty sleeping  HEME/LYMPH: No easy bruising, no bleeding gums    [VITALS SIGNS 24hrs]  Vital Signs Last 24 Hrs  T(C): 36.6 (18 Mar 2024 12:46), Max: 37.1 (17 Mar 2024 22:53)  T(F): 97.8 (18 Mar 2024 12:46), Max: 98.8 (17 Mar 2024 22:53)  HR: 86 (18 Mar 2024 12:46) (86 - 90)  BP: 115/66 (18 Mar 2024 12:46) (115/66 - 159/76)  BP(mean): --  RR: 18 (18 Mar 2024 12:46) (16 - 19)  SpO2: 97% (18 Mar 2024 12:46) (96% - 99%)    Parameters below as of 18 Mar 2024 12:46  Patient On (Oxygen Delivery Method): room air      Daily     Daily Weight in k (18 Mar 2024 05:14)    I&O's Summary    18 Mar 2024 07:01  -  18 Mar 2024 15:31  --------------------------------------------------------  IN: 640 mL / OUT: 0 mL / NET: 640 mL        [PHYSICAL EXAM]  GEN:   HEENT: normocephalic and atraumatic. EOMI. PERRL.    NECK: Supple.  No lymphadenopathy   LUNGS: Clear to auscultation.  HEART: S1S2 Regular rate and rhythm, no MRG  ABDOMEN: Soft, nontender, and nondistended.  Positive bowel sounds.    : No CVA tenderness  EXTREMITIES: Without edema.  NEUROLOGIC: grossly intact.  PSYCHIATRIC: Appropriate affect .  SKIN: No rash     [LABS: ]                        11.5   11.00 )-----------( 248      ( 18 Mar 2024 06:10 )             35.1     CBC Full  -  ( 18 Mar 2024 06:10 )  WBC Count : 11.00 K/uL  RBC Count : 3.98 M/uL  Hemoglobin : 11.5 g/dL  Hematocrit : 35.1 %  Platelet Count - Automated : 248 K/uL  Mean Cell Volume : 88.2 fl  Mean Cell Hemoglobin : 28.9 pg  Mean Cell Hemoglobin Concentration : 32.8 gm/dL  Auto Neutrophil # : x  Auto Lymphocyte # : x  Auto Monocyte # : x  Auto Eosinophil # : x  Auto Basophil # : x  Auto Neutrophil % : x  Auto Lymphocyte % : x  Auto Monocyte % : x  Auto Eosinophil % : x  Auto Basophil % : x    03-18    146<H>  |  119<H>  |  9   ----------------------------<  80  4.0   |  20<L>  |  1.20    Ca    7.4<L>      18 Mar 2024 06:10    TPro  5.1<L>  /  Alb  2.0<L>  /  TBili  0.2  /  DBili  x   /  AST  44<H>  /  ALT  67  /  AlkPhos  44  03-18    PT/INR - ( 17 Mar 2024 14:45 )   PT: 11.6 sec;   INR: 0.99 ratio         PTT - ( 17 Mar 2024 14:45 )  PTT:31.0 sec  LIVER FUNCTIONS - ( 18 Mar 2024 06:10 )  Alb: 2.0 g/dL / Pro: 5.1 g/dL / ALK PHOS: 44 U/L / ALT: 67 U/L / AST: 44 U/L / GGT: x               Urinalysis Basic - ( 18 Mar 2024 06:10 )    Color: x / Appearance: x / SG: x / pH: x  Gluc: 80 mg/dL / Ketone: x  / Bili: x / Urobili: x   Blood: x / Protein: x / Nitrite: x   Leuk Esterase: x / RBC: x / WBC x   Sq Epi: x / Non Sq Epi: x / Bacteria: x          CBC TREND (5 Days)  WBC Count: 11.00 K/uL ( @ 06:10)  WBC Count: 11.88 K/uL ( @ 14:45)    Hemoglobin: 11.5 g/dL ( @ 06:10)  Hemoglobin: 13.9 g/dL ( @ 14:45)    Hematocrit: 35.1 % ( @ 06:10)  Hematocrit: 42.3 % ( @ 14:45)    Platelet Count - Automated: 248 K/uL ( @ 06:10)  Platelet Count - Automated: 285 K/uL ( @ 14:45)                                            [MICROBIOLOGY /  VIROLOGY:]  SARS-CoV-2: Shauntec (17 Mar 2024 16:28)          [PATHOLOGY]       [RADIOLOGY & ADDITIONAL STUDIES:]     CT Abdomen and Pelvis No Cont:   ACC: [id number] EXAM:  CT ABDOMEN AND PELVIS   ORDERED BY: BELGICA AMEZCUA     PROCEDURE DATE:  2024          INTERPRETATION:  CLINICAL INFORMATION: 63 year-old male with metastatic   right renal cell carcinoma status post right nephrectomy. Presents with   weakness and diarrhea    COMPARISON: PET CT 3/11/2024    CONTRAST/COMPLICATIONS:  IV Contrast: None  Oral Contrast: None  Complications: None    PROCEDURE:  CT of the Abdomen and Pelvis was performed.  Sagittal and coronal reformats were performed.    FINDINGS:  LOWER CHEST: Clear.    LIVER: Scattered small hypodensities.  BILE DUCTS: Nondilated.  GALLBLADDER: Sludge.  SPLEEN: Normal.  PANCREAS: Normal.  ADRENALS: Normal.  KIDNEYS/URETERS: Status post right nephrectomy. No hydronephrosis or   urinary tract calculi in the left kidney.    BLADDER: Normal.  REPRODUCTIVE ORGANS: Nonenlarged.    BOWEL: No bowel obstruction or bowel inflammation.  PERITONEUM: No free air or ascites.  VESSELS: Aortoiliac atherosclerosis without aneurysm.  RETROPERITONEUM/LYMPH NODES: No adenopathy.  ABDOMINAL WALL: Normal.  BONES: No acute bony abnormality. Stable lytic lesion at the right   anterior iliac bone.    IMPRESSION:  *  No acute pathology.  *  Additional stable findings as described above.      --- End of Report ---            LINDA ADAMS MD; Attending Radiologist  This document has been electronically signed. Mar 17 2024  4:51PM (24 @ 16:09)     Patient is a 63y old  Male who presents with a chief complaint of diarrhea/weakness (18 Mar 2024 14:46)    HPI:  63 M hx former smoker, cancer right kidney s/p nephrectomy (in [place]), cad s/p stents (on asa/plavix), bph, htn, hld c/o diarrhea for the past 8 days leading to feeling dehydrated along with abd discomfort. He also reports some weight loss. He states that he has also been nauseous and had a few episodes of emesis. He states that he last vomited this afternoon. His last episode of diarrhea was around 7 pm. Patient reports feeling better receiving IV fluids. His PMD prescribed Cipro and Flagyl which has provided no relief. Patient has right chest port. He receives immunotherapy chemo (total 3 doses, last was 3 weeks ago, next dose due this upcoming Tuesday). He denies fever, cp, sob, constipation, and sick contacts. He reports chills, abd pain, N/V and diarrhea.   Wife present at bedside.     ED course:   Vitals: T 97.8 HR 90 /89  Labs: wbc 11.88 Lactate 2.8--> 1.7   Imaging:   CT abd/pelvis: *  No acute pathology, Additional stable findings as described above.  PET: 1.  Diffuse hypermetabolic uptake at thickened duodenum. Correlate   clinically. Repeat MRI of the abdomen may be helpful.  2.  Focus of increased uptake in the RIGHT paravertebral musculature   posterior to the lumbar spine is nonspecific; MRI may be obtained for   further assessment.  3.  Improved size and activity at lymph nodes in the chest and pulmonary   nodules.  4.  Improved size and activity at previously seen destructive anterior   RIGHT iliac bone lesion.  5.  No distinct abnormal uptake at RIGHT nephrectomy site.  Given IV bolus x3, Ertapenem x1    (17 Mar 2024 20:03)    Patient here of diarrhea illness occurring last week.  Over the weekend prior was out at a restaurant.  Had cold chicken dish at [employer] which only patient had on Saturday and then  morning.  Subsequently later in the week started having diarrhea.  On Wednesday was seen in the office and given IV hydration and antiemetic with improvement in symptoms.  Felt better.  On Thursday and Friday had call PMD was prescribed Cipro Flagyl which patient took.  Noted subsequently symptoms worsen on , and came to the ER overnight.  Since in hospital off antibiotics.  Episodes of diarrhea decreasing.  No blood in stool.  No chest pain or shortness of breath.  No fever.  No sick contacts.      PAST MEDICAL & SURGICAL HISTORY:  Lung disorder  HTN (hypertension)  CAD (coronary artery disease)  Former smoker  HLD (hyperlipidemia)  History of BPH  Cancer of right kidney  History of nephrectomy right  H/O sleep apnea  History of cardiac cath  H/O right nephrectomy  History of percutaneous coronary intervention      HEALTH ISSUES - PROBLEM Dx:  Diarrhea  Cancer of kidney  PUNEET (acute kidney injury)  HTN (hypertension)  CAD (coronary artery disease)  Encounter for deep vein thrombosis (DVT) prophylaxis    Diarrhea [R19.7]  Lung disorder [J98.4]  HTN (hypertension) [I10]  CAD (coronary artery disease) [I25.10]  Former smoker [Z87.891]  HLD (hyperlipidemia) [E78.5]  History of BPH [Z87.438]  Cancer of right kidney [C64.1]  History of nephrectomy [Z90.5]  Asthma [J45.909]  H/O sleep apnea [Z86.69]  History of cardiac cath [Z98.890]  H/O right nephrectomy [Z90.5]  History of percutaneous coronary intervention [Z98.61]  Weakness [R53.1]  Bandemia [D72.825]      FAMILY HISTORY:  FH: HTN (hypertension) (Mother)  FH: type 2 diabetes (Father)      [SOCIAL HISTORY: ]     smoking:  none currently. 0.5 PPD for 40 years.  Quit after [facility]CA     EtOH:  none currently     illicit drugs:  none currently     occupation:  Retired     marital status:  .  Has 2 children.        Other:       [ALLERGIES/INTOLERANCES:]  Allergies     penicillin (Rash)  Intolerances      [MEDICATIONS]  MEDICATIONS  (STANDING):  aspirin enteric coated 81 milliGRAM(s) Oral daily  atorvastatin 20 milliGRAM(s) Oral at bedtime  clopidogrel Tablet 75 milliGRAM(s) Oral daily  famotidine Injectable 20 milliGRAM(s) IV Push daily  heparin   Injectable 5000 Unit(s) SubCutaneous every 12 hours  lactated ringers. 1000 milliLiter(s) (80 mL/Hr) IV Continuous <Continuous>      MEDICATIONS  (PRN):  acetaminophen     Tablet .. 650 milliGRAM(s) Oral every 6 hours PRN Temp greater or equal to 38C (100.4F), Mild Pain (1 - 3)  aluminum hydroxide/magnesium hydroxide/simethicone Suspension 30 milliLiter(s) Oral every 4 hours PRN Dyspepsia  melatonin 3 milliGRAM(s) Oral at bedtime PRN Insomnia  morphine  - Injectable 2 milliGRAM(s) IV Push every 4 hours PRN Moderate Pain (4 - 6)  ondansetron Injectable 4 milliGRAM(s) IV Push every 8 hours PRN Nausea and/or Vomiting      [REVIEW OF SYSTEMS: ]  CONSTITUTIONAL: normal, no fever, no shakes, no chills   EYES: No eye pain, no visual disturbances, no discharge  ENMT:  no discharge  NECK: No pain, no stiffness  BREASTS: No pain, no masses, no nipple discharge  RESPIRATORY: No cough, no wheezing, no chills, no hemoptysis; No shortness of breath  CARDIOVASCULAR: No chest pain, no palpitations, no dizziness, no leg swelling  GASTROINTESTINAL: No abdominal, no epigastric pain. No nausea, no vomiting, no hematemesis; +diarrhea , no constipation. No melena, no hematochezia.  GENITOURINARY: No dysuria, no frequency, no hematuria, no incontinence  NEUROLOGICAL: No headaches, no memory loss, no loss of strength, no numbness, no tremors  SKIN: No itching, no burning, no rashes, no lesions   LYMPH NODES: No enlarged glands  ENDOCRINE: No heat or cold intolerance; No hair loss  MUSCULOSKELETAL: No joint pain or swelling; No muscle, no back, no extremity pain  PSYCHIATRIC: No depression, no anxiety, no mood swings, no difficulty sleeping  HEME/LYMPH: No easy bruising, no bleeding gums    [VITALS SIGNS 24hrs]  Vital Signs Last 24 Hrs  T(C): 36.6 (18 Mar 2024 12:46), Max: 37.1 (17 Mar 2024 22:53)  T(F): 97.8 (18 Mar 2024 12:46), Max: 98.8 (17 Mar 2024 22:53)  HR: 86 (18 Mar 2024 12:46) (86 - 90)  BP: 115/66 (18 Mar 2024 12:46) (115/66 - 159/76)  BP(mean): --  RR: 18 (18 Mar 2024 12:46) (16 - 19)  SpO2: 97% (18 Mar 2024 12:46) (96% - 99%)    Parameters below as of 18 Mar 2024 12:46  Patient On (Oxygen Delivery Method): room air      Daily     Daily Weight in k (18 Mar 2024 05:14)    I&O's Summary    18 Mar 2024 07:01  -  18 Mar 2024 15:31  --------------------------------------------------------  IN: 640 mL / OUT: 0 mL / NET: 640 mL        [PHYSICAL EXAM]  GEN: Well-nourished, well-developed.  No acute distress.  HEENT: normocephalic and atraumatic. EOMI. PERRL.    NECK: Supple.  No lymphadenopathy   LUNGS: Clear to auscultation.  HEART: S1S2 Regular rate and rhythm, no MRG  ABDOMEN: Soft, nontender, and nondistended.  Positive bowel sounds.    : No CVA tenderness  EXTREMITIES: Without edema.  NEUROLOGIC: grossly intact.  PSYCHIATRIC: Appropriate affect .  SKIN: No rash     [LABS: ]                        11.5   11.00 )-----------( 248      ( 18 Mar 2024 06:10 )             35.1     CBC Full  -  ( 18 Mar 2024 06:10 )  WBC Count : 11.00 K/uL  RBC Count : 3.98 M/uL  Hemoglobin : 11.5 g/dL  Hematocrit : 35.1 %  Platelet Count - Automated : 248 K/uL  Mean Cell Volume : 88.2 fl  Mean Cell Hemoglobin : 28.9 pg  Mean Cell Hemoglobin Concentration : 32.8 gm/dL  Auto Neutrophil # : x  Auto Lymphocyte # : x  Auto Monocyte # : x  Auto Eosinophil # : x  Auto Basophil # : x  Auto Neutrophil % : x  Auto Lymphocyte % : x  Auto Monocyte % : x  Auto Eosinophil % : x  Auto Basophil % : x    03    146<H>  |  119<H>  |  9   ----------------------------<  80  4.0   |  20<L>  |  1.20    Ca    7.4<L>      18 Mar 2024 06:10    TPro  5.1<L>  /  Alb  2.0<L>  /  TBili  0.2  /  DBili  x   /  AST  44<H>  /  ALT  67  /  AlkPhos  44  03-18    PT/INR - ( 17 Mar 2024 14:45 )   PT: 11.6 sec;   INR: 0.99 ratio         PTT - ( 17 Mar 2024 14:45 )  PTT:31.0 sec  LIVER FUNCTIONS - ( 18 Mar 2024 06:10 )  Alb: 2.0 g/dL / Pro: 5.1 g/dL / ALK PHOS: 44 U/L / ALT: 67 U/L / AST: 44 U/L / GGT: x           Urinalysis Basic - ( 18 Mar 2024 06:10 )  Color: x / Appearance: x / SG: x / pH: x  Gluc: 80 mg/dL / Ketone: x  / Bili: x / Urobili: x   Blood: x / Protein: x / Nitrite: x   Leuk Esterase: x / RBC: x / WBC x   Sq Epi: x / Non Sq Epi: x / Bacteria: x    CBC TREND (5 Days)  WBC Count: 11.00 K/uL ( @ 06:10)  WBC Count: 11.88 K/uL ( @ 14:45)  Hemoglobin: 11.5 g/dL ( @ 06:10)  Hemoglobin: 13.9 g/dL ( @ 14:45)  Hematocrit: 35.1 % ( @ 06:10)  Hematocrit: 42.3 % ( @ 14:45)  Platelet Count - Automated: 248 K/uL ( @ 06:10)  Platelet Count - Automated: 285 K/uL ( @ 14:45)        [MICROBIOLOGY /  VIROLOGY:]  SARS-CoV-2: NotDetec (17 Mar 2024 16:28)      [PATHOLOGY]       [RADIOLOGY & ADDITIONAL STUDIES:]     CT Abdomen and Pelvis No Cont:   ACC: [id number] EXAM:  CT ABDOMEN AND PELVIS   ORDERED BY: BELGICA AMEZCUA   PROCEDURE DATE:  2024    INTERPRETATION:  CLINICAL INFORMATION: 63 year-old male with metastatic right renal cell carcinoma status post right nephrectomy. Presents with   weakness and diarrhea  COMPARISON: PET CT 3/11/2024    CONTRAST/COMPLICATIONS:  IV Contrast: None  Oral Contrast: None  Complications: None    PROCEDURE:  CT of the Abdomen and Pelvis was performed.  Sagittal and coronal reformats were performed.    FINDINGS:  LOWER CHEST: Clear.    LIVER: Scattered small hypodensities.  BILE DUCTS: Nondilated.  GALLBLADDER: Sludge.  SPLEEN: Normal.  PANCREAS: Normal.  ADRENALS: Normal.  KIDNEYS/URETERS: Status post right nephrectomy. No hydronephrosis or urinary tract calculi in the left kidney.  BLADDER: Normal.  REPRODUCTIVE ORGANS: Nonenlarged.  BOWEL: No bowel obstruction or bowel inflammation.  PERITONEUM: No free air or ascites.  VESSELS: Aortoiliac atherosclerosis without aneurysm.  RETROPERITONEUM/LYMPH NODES: No adenopathy.  ABDOMINAL WALL: Normal.  BONES: No acute bony abnormality. Stable lytic lesion at the right anterior iliac bone.    IMPRESSION:  *  No acute pathology.  *  Additional stable findings as described above.  --- End of Report ---  LINDA ADAMS MD; Attending Radiologist  This document has been electronically signed. Mar 17 2024  4:51PM (24 @ 16:09)

## 2024-03-18 NOTE — CARE COORDINATION ASSESSMENT. - NSCAREPROVIDERS_GEN_ALL_CORE_FT
CARE PROVIDERS:  Accepting Physician: Megan Martinez  Administration: Stefan Hay  Administration: Pino Corona  Administration: Pawel Hutton  Admitting: Megan Martinez  Attending: Megan Martinez  Case Management: Laz Spivey  Consultant: Carolee Wilson  Covering Team: Megan Martinez  Covering Team: Yousif Steiner  ED ACP: Sonia Gupta  ED Attending: Catie Vela  ED Nurse: Pelon Kenney  ED Nurse 2: Mojgan Blevins  Nurse: Alejandra Luz  Nurse: Libertad Bedolla  Nurse: Annalisa Lorenzo  Oncology: Sim,   Ordered: ServiceAccount, SCMMLM  Ordered: ADM, User  PCA/Nursing Assistant: Kristan Smith  PCA/Nursing Assistant: Julissa Robin  Primary Team: Kaitlin Guido  Primary Team: Rony Villa  Primary Team: Niharika Burgos  Registered Dietitian: Argentina Hayes  Respiratory Therapy: Pelon Mata  Team: [facility], Team

## 2024-03-18 NOTE — CONSULT NOTE ADULT - SUBJECTIVE AND OBJECTIVE BOX
[facility]  INFECTIOUS DISEASES   [address] [place] [address]  Tel: [phone]     Fax: [phone]  ========================================================  MD Rowdy Lawson Kaushal, MD Cho, Michelle, MD Sunjit, Jaspal, MD  ========================================================    MRN-[id number]  MARKUS SPENCE     CC: Patient is a 63y old  Male who presents with a chief complaint of diarrhea/weakness (17 Mar 2024 20:03)    HPI:  63 M hx former smoker, cancer right kidney s/p nephrectomy (in [facility]), cad s/p stents (on asa/plavix), bph, htn, hld c/o diarrhea for the past 8 days leading to feeling dehydrated along with abd discomfort. He also reports some weight loss. He states that he has also been nauseous and had a few episodes of emesis. He states that he last vomited this afternoon. His last episode of diarrhea was around 7 pm. Patient reports feeling better receiving IV fluids. His PMD prescribed Cipro and Flagyl which has provided no relief. Patient has right chest port. He receives immunotherapy chemo (total 3 doses, last was 3 weeks ago, next dose due this upcoming Tuesday). He denies fever, cp, sob, constipation, and sick contacts. He reports chills, abd pain, N/V and diarrhea.   Wife present at bedside.     ED course:   Vitals: T 97.8 HR 90 /89  Labs: wbc 11.88 Lactate 2.8--> 1.7   Imaging:   CT abd/pelvis: *  No acute pathology, Additional stable findings as described above.  PET: 1.  Diffuse hypermetabolic uptake at thickened duodenum. Correlate   clinically. Repeat MRI of the abdomen may be helpful.  2.  Focus of increased uptake in the RIGHT paravertebral musculature   posterior to the lumbar spine is nonspecific; MRI may be obtained for   further assessment.  3.  Improved size and activity at lymph nodes in the chest and pulmonary   nodules.  4.  Improved size and activity at previously seen destructive anterior   RIGHT iliac bone lesion.  5.  No distinct abnormal uptake at RIGHT nephrectomy site.  Given IV bolus x3, Ertapenem x1      (17 Mar 2024 20:03)      PAST MEDICAL & SURGICAL HISTORY:  Lung disorder  HTN (hypertension)  CAD (coronary artery disease)  Former smoker  HLD (hyperlipidemia)  History of BPH  Cancer of right kidney  History of nephrectomy  right  H/O sleep apnea  History of cardiac cath  H/O right nephrectomy  History of percutaneous coronary intervention    Social Hx: Former smoking, No EtOH or drugs     FAMILY HISTORY:  FH: HTN (hypertension) (Mother)  FH: type 2 diabetes (Father)    Allergies  penicillin (Rash)    MEDICATIONS  (STANDING):  aspirin  chewable 81 milliGRAM(s) Oral daily  atorvastatin 20 milliGRAM(s) Oral at bedtime  clopidogrel Tablet 75 milliGRAM(s) Oral daily  heparin   Injectable 5000 Unit(s) SubCutaneous every 12 hours  lactated ringers. 1000 milliLiter(s) (80 mL/Hr) IV Continuous <Continuous>    MEDICATIONS  (PRN):  acetaminophen     Tablet .. 650 milliGRAM(s) Oral every 6 hours PRN Temp greater or equal to 38C (100.4F), Mild Pain (1 - 3)  aluminum hydroxide/magnesium hydroxide/simethicone Suspension 30 milliLiter(s) Oral every 4 hours PRN Dyspepsia  melatonin 3 milliGRAM(s) Oral at bedtime PRN Insomnia  morphine  - Injectable 2 milliGRAM(s) IV Push every 4 hours PRN Moderate Pain (4 - 6)  ondansetron Injectable 4 milliGRAM(s) IV Push every 8 hours PRN Nausea and/or Vomiting     REVIEW OF SYSTEMS:  CONSTITUTIONAL:  No Fever or chills  HEENT:  No diplopia or blurred vision.  No sore throat or runny nose.  CARDIOVASCULAR:  No chest pain   RESPIRATORY:  No cough, shortness of breath, PND or orthopnea.  GASTROINTESTINAL:  No nausea, vomiting, + diarrhea.  GENITOURINARY:  No dysuria, frequency or urgency. No Blood in urine  MUSCULOSKELETAL:  no joint aches, no muscle pain  SKIN:  No change in skin, hair or nails.    Physical Exam:  Vital Signs Last 24 Hrs  T(C): 36.6 (18 Mar 2024 05:14), Max: 37.1 (17 Mar 2024 22:53)  T(F): 97.8 (18 Mar 2024 05:14), Max: 98.8 (17 Mar 2024 22:53)  HR: 88 (18 Mar 2024 05:14) (87 - 94)  BP: 136/75 (18 Mar 2024 05:14) (129/79 - 159/76)  RR: 18 (18 Mar 2024 05:14) (16 - 19)  SpO2: 98% (18 Mar 2024 05:14) (96% - 99%)  Parameters below as of 18 Mar 2024 05:14  Patient On (Oxygen Delivery Method): room air  Height (cm): 182.9 (03-17 @ 14:39)  Weight (kg): 82.6 (03-17 @ 14:39)  BMI (kg/m2): 24.7 (03-17 @ 14:39)  BSA (m2): 2.05 (03-17 @ 14:39)  GEN: NAD  HEENT: normocephalic and atraumatic. EOMI. PERRL.    NECK: Supple.  No lymphadenopathy   LUNGS: Clear to auscultation.  HEART: Regular rate and rhythm   ABDOMEN: Soft, nontender, and nondistended.  Positive bowel sounds.    : No CVA tenderness  EXTREMITIES: Without edema.  NEUROLOGIC: grossly intact.  PSYCHIATRIC: Appropriate affect .  SKIN: No rash     Labs:  03-18    146<H>  |  119<H>  |  9   ----------------------------<  80  4.0   |  20<L>  |  1.20    Ca    7.4<L>      18 Mar 2024 06:10    TPro  5.1<L>  /  Alb  2.0<L>  /  TBili  0.2  /  DBili  x   /  AST  44<H>  /  ALT  67  /  AlkPhos  44  03-18                        11.5   11.00 )-----------( 248      ( 18 Mar 2024 06:10 )             35.1     PT/INR - ( 17 Mar 2024 14:45 )   PT: 11.6 sec;   INR: 0.99 ratio    PTT - ( 17 Mar 2024 14:45 )  PTT:31.0 sec  Urinalysis Basic - ( 18 Mar 2024 06:10 )    Color: x / Appearance: x / SG: x / pH: x  Gluc: 80 mg/dL / Ketone: x  / Bili: x / Urobili: x   Blood: x / Protein: x / Nitrite: x   Leuk Esterase: x / RBC: x / WBC x   Sq Epi: x / Non Sq Epi: x / Bacteria: x    LIVER FUNCTIONS - ( 18 Mar 2024 06:10 )  Alb: 2.0 g/dL / Pro: 5.1 g/dL / ALK PHOS: 44 U/L / ALT: 67 U/L / AST: 44 U/L / GGT: x           SARS-CoV-2: NotDetec (03-17-24 @ 16:28)    RECENT CULTURES:  03-17 @ 18:50 .Stool Feces     Testing in progress    03-17 @ 16:28      [facility]    All imaging and other data have been reviewed.  < from: CT Abdomen and Pelvis No Cont (03.17.24 @ 16:09) >  ACC: [id number] EXAM:  CT ABDOMEN AND PELVIS   ORDERED BY: BELGICA AMEZCUA   PROCEDURE DATE:  03/17/2024    INTERPRETATION:  CLINICAL INFORMATION: 63 year-old male with metastatic   right renal cell carcinoma status post right nephrectomy. Presents with   weakness and diarrhea  COMPARISON: PET CT 3/11/2024  CONTRAST/COMPLICATIONS:  IV Contrast: None  Oral Contrast: None  Complications: None  PROCEDURE:  CT of the Abdomen and Pelvis was performed.  Sagittal and coronal reformats were performed.  FINDINGS:  LOWER CHEST: Clear.  LIVER: Scattered small hypodensities.  BILE DUCTS: Nondilated.  GALLBLADDER: Sludge.  SPLEEN: Normal.  PANCREAS: Normal.  ADRENALS: Normal.  KIDNEYS/URETERS: Status post right nephrectomy. No hydronephrosis or   urinary tract calculi in the left kidney.  BLADDER: Normal.  REPRODUCTIVE ORGANS: Nonenlarged.  BOWEL: No bowel obstruction or bowel inflammation.  PERITONEUM: No free air or ascites.  VESSELS: Aortoiliac atherosclerosis without aneurysm.  RETROPERITONEUM/LYMPH NODES: No adenopathy.  ABDOMINAL WALL: Normal.  BONES: No acute bony abnormality. Stable lytic lesion at the right   anterior iliac bone.  IMPRESSION:  *  No acute pathology.  *  Additional stable findings as described above.    Assessment and Plan:   64yo man with PMH of HTN, BPH, CAD, former smoker, right kidney malignancy s/p nephrectomy (in [place]), on immunotherapy was admitted with 10-15 episodes of diarrhea for 8 days.   He is nauseous but had once vomited.  He has started immunotherapy in 1/2024 and in 2/2024 also had diarrhea after treatment but just for one day.    No recent antibiotics prior to diarrhea, no sick contact. Lives with wife who is healthy. No pets. No recent travel, last one in 5/2023.     Unclear if this is a side effect from Immunotherapy or infectious gastroenteritis. GI PCR negative and C diff is pending. Bands are very high with normal WBC.   He had one dose of ertapenem.     # Diarrhea  # renal malignancy on immunotherapy    - Will follow blood cultures   - Will follow stool studies  - Monitor WBC/Bands and Tmax  - Watch closely off antibiotics   - Hem/onc consult to evaluate for possible immunotherapy side effects   - If becomes hemodynamically unstable or febrile will start broad spectrum empiric ABx treatment     Thank you for courtesy of this consult.     Will follow.  Discussed with the primary team.     Carolee Wilson MD  Division of Infectious Diseases   Please call ID service at [phone] with any question.    75 minutes spent on total encounter assessing patient, examination, chart review, counseling and coordinating care by the attending physician/nurse/care manager.   [facility]  INFECTIOUS DISEASES   [address] [place] [address]  Tel: [phone]     Fax: [phone]  ========================================================  MD Rowdy Lawson Kaushal, MD Cho, Michelle, MD Sunjit, Jaspal, MD  ========================================================    MRN-[id number]  MARKUS SPENCE     CC: Patient is a 63y old  Male who presents with a chief complaint of diarrhea/weakness (17 Mar 2024 20:03)    HPI:  63 M hx former smoker, cancer right kidney s/p nephrectomy (in [facility]), cad s/p stents (on asa/plavix), bph, htn, hld c/o diarrhea for the past 8 days leading to feeling dehydrated along with abd discomfort. He also reports some weight loss. He states that he has also been nauseous and had a few episodes of emesis. He states that he last vomited this afternoon. His last episode of diarrhea was around 7 pm. Patient reports feeling better receiving IV fluids. His PMD prescribed Cipro and Flagyl which has provided no relief. Patient has right chest port. He receives immunotherapy chemo (total 3 doses, last was 3 weeks ago, next dose due this upcoming Tuesday). He denies fever, cp, sob, constipation, and sick contacts. He reports chills, abd pain, N/V and diarrhea.   Wife present at bedside.     ED course:   Vitals: T 97.8 HR 90 /89  Labs: wbc 11.88 Lactate 2.8--> 1.7   Imaging:   CT abd/pelvis: *  No acute pathology, Additional stable findings as described above.  PET: 1.  Diffuse hypermetabolic uptake at thickened duodenum. Correlate   clinically. Repeat MRI of the abdomen may be helpful.  2.  Focus of increased uptake in the RIGHT paravertebral musculature   posterior to the lumbar spine is nonspecific; MRI may be obtained for   further assessment.  3.  Improved size and activity at lymph nodes in the chest and pulmonary   nodules.  4.  Improved size and activity at previously seen destructive anterior   RIGHT iliac bone lesion.  5.  No distinct abnormal uptake at RIGHT nephrectomy site.  Given IV bolus x3, Ertapenem x1      (17 Mar 2024 20:03)      PAST MEDICAL & SURGICAL HISTORY:  Lung disorder  HTN (hypertension)  CAD (coronary artery disease)  Former smoker  HLD (hyperlipidemia)  History of BPH  Cancer of right kidney  History of nephrectomy  right  H/O sleep apnea  History of cardiac cath  H/O right nephrectomy  History of percutaneous coronary intervention    Social Hx: Former smoking, No EtOH or drugs     FAMILY HISTORY:  FH: HTN (hypertension) (Mother)  FH: type 2 diabetes (Father)    Allergies  penicillin (Rash)    MEDICATIONS  (STANDING):  aspirin  chewable 81 milliGRAM(s) Oral daily  atorvastatin 20 milliGRAM(s) Oral at bedtime  clopidogrel Tablet 75 milliGRAM(s) Oral daily  heparin   Injectable 5000 Unit(s) SubCutaneous every 12 hours  lactated ringers. 1000 milliLiter(s) (80 mL/Hr) IV Continuous <Continuous>    MEDICATIONS  (PRN):  acetaminophen     Tablet .. 650 milliGRAM(s) Oral every 6 hours PRN Temp greater or equal to 38C (100.4F), Mild Pain (1 - 3)  aluminum hydroxide/magnesium hydroxide/simethicone Suspension 30 milliLiter(s) Oral every 4 hours PRN Dyspepsia  melatonin 3 milliGRAM(s) Oral at bedtime PRN Insomnia  morphine  - Injectable 2 milliGRAM(s) IV Push every 4 hours PRN Moderate Pain (4 - 6)  ondansetron Injectable 4 milliGRAM(s) IV Push every 8 hours PRN Nausea and/or Vomiting     REVIEW OF SYSTEMS:  CONSTITUTIONAL:  No Fever or chills  HEENT:  No diplopia or blurred vision.  No sore throat or runny nose.  CARDIOVASCULAR:  No chest pain   RESPIRATORY:  No cough, shortness of breath, PND or orthopnea.  GASTROINTESTINAL:  No nausea, vomiting, + diarrhea.  GENITOURINARY:  No dysuria, frequency or urgency. No Blood in urine  MUSCULOSKELETAL:  no joint aches, no muscle pain  SKIN:  No change in skin, hair or nails.    Physical Exam:  Vital Signs Last 24 Hrs  T(C): 36.6 (18 Mar 2024 05:14), Max: 37.1 (17 Mar 2024 22:53)  T(F): 97.8 (18 Mar 2024 05:14), Max: 98.8 (17 Mar 2024 22:53)  HR: 88 (18 Mar 2024 05:14) (87 - 94)  BP: 136/75 (18 Mar 2024 05:14) (129/79 - 159/76)  RR: 18 (18 Mar 2024 05:14) (16 - 19)  SpO2: 98% (18 Mar 2024 05:14) (96% - 99%)  Parameters below as of 18 Mar 2024 05:14  Patient On (Oxygen Delivery Method): room air  Height (cm): 182.9 (03-17 @ 14:39)  Weight (kg): 82.6 (03-17 @ 14:39)  BMI (kg/m2): 24.7 (03-17 @ 14:39)  BSA (m2): 2.05 (03-17 @ 14:39)  GEN: NAD  HEENT: normocephalic and atraumatic. EOMI. PERRL.    NECK: Supple.  No lymphadenopathy   LUNGS: Clear to auscultation.  HEART: Regular rate and rhythm   ABDOMEN: Soft, nontender, and nondistended.  Positive bowel sounds.    : No CVA tenderness  EXTREMITIES: Without edema.  NEUROLOGIC: grossly intact.  PSYCHIATRIC: Appropriate affect .  SKIN: No rash     Labs:  03-18    146<H>  |  119<H>  |  9   ----------------------------<  80  4.0   |  20<L>  |  1.20    Ca    7.4<L>      18 Mar 2024 06:10    TPro  5.1<L>  /  Alb  2.0<L>  /  TBili  0.2  /  DBili  x   /  AST  44<H>  /  ALT  67  /  AlkPhos  44  03-18                        11.5   11.00 )-----------( 248      ( 18 Mar 2024 06:10 )             35.1     PT/INR - ( 17 Mar 2024 14:45 )   PT: 11.6 sec;   INR: 0.99 ratio    PTT - ( 17 Mar 2024 14:45 )  PTT:31.0 sec  Urinalysis Basic - ( 18 Mar 2024 06:10 )    Color: x / Appearance: x / SG: x / pH: x  Gluc: 80 mg/dL / Ketone: x  / Bili: x / Urobili: x   Blood: x / Protein: x / Nitrite: x   Leuk Esterase: x / RBC: x / WBC x   Sq Epi: x / Non Sq Epi: x / Bacteria: x    LIVER FUNCTIONS - ( 18 Mar 2024 06:10 )  Alb: 2.0 g/dL / Pro: 5.1 g/dL / ALK PHOS: 44 U/L / ALT: 67 U/L / AST: 44 U/L / GGT: x           SARS-CoV-2: NotDetec (03-17-24 @ 16:28)    RECENT CULTURES:  03-17 @ 18:50 .Stool Feces     Testing in progress    03-17 @ 16:28      [facility]    All imaging and other data have been reviewed.  < from: CT Abdomen and Pelvis No Cont (03.17.24 @ 16:09) >  ACC: [id number] EXAM:  CT ABDOMEN AND PELVIS   ORDERED BY: BELGICA AMEZCUA   PROCEDURE DATE:  03/17/2024    INTERPRETATION:  CLINICAL INFORMATION: 63 year-old male with metastatic   right renal cell carcinoma status post right nephrectomy. Presents with   weakness and diarrhea  COMPARISON: PET CT 3/11/2024  CONTRAST/COMPLICATIONS:  IV Contrast: None  Oral Contrast: None  Complications: None  PROCEDURE:  CT of the Abdomen and Pelvis was performed.  Sagittal and coronal reformats were performed.  FINDINGS:  LOWER CHEST: Clear.  LIVER: Scattered small hypodensities.  BILE DUCTS: Nondilated.  GALLBLADDER: Sludge.  SPLEEN: Normal.  PANCREAS: Normal.  ADRENALS: Normal.  KIDNEYS/URETERS: Status post right nephrectomy. No hydronephrosis or   urinary tract calculi in the left kidney.  BLADDER: Normal.  REPRODUCTIVE ORGANS: Nonenlarged.  BOWEL: No bowel obstruction or bowel inflammation.  PERITONEUM: No free air or ascites.  VESSELS: Aortoiliac atherosclerosis without aneurysm.  RETROPERITONEUM/LYMPH NODES: No adenopathy.  ABDOMINAL WALL: Normal.  BONES: No acute bony abnormality. Stable lytic lesion at the right   anterior iliac bone.  IMPRESSION:  *  No acute pathology.  *  Additional stable findings as described above.    Assessment and Plan:   64yo man with PMH of HTN, BPH, CAD, former smoker, right kidney malignancy s/p nephrectomy (in [place]), on immunotherapy was admitted with 10-15 episodes of diarrhea for 8 days.   He is nauseous but had once vomited.  He has started immunotherapy in 1/2024 and in 2/2024 also had diarrhea after treatment but just for one day.    No recent antibiotics prior to diarrhea, no sick contact. Lives with wife who is healthy. No pets. No recent travel, last one in 5/2023.     Unclear if this is a side effect from Immunotherapy or infectious gastroenteritis. GI PCR negative and C diff is pending. Bands are very high with WBC of 11.   He had one dose of ertapenem.     # Diarrhea  # renal malignancy on immunotherapy    - Will follow blood cultures   - Will follow stool studies  - Monitor WBC/Bands and Tmax  - Watch closely off antibiotics   - Hem/onc consult to evaluate for possible immunotherapy side effects   - If becomes hemodynamically unstable or febrile will start broad spectrum empiric ABx treatment     Thank you for courtesy of this consult.     Will follow.  Discussed with the primary team.     Carolee Wilson MD  Division of Infectious Diseases   Please call ID service at [phone] with any question.    75 minutes spent on total encounter assessing patient, examination, chart review, counseling and coordinating care by the attending physician/nurse/care manager.

## 2024-03-18 NOTE — CARE COORDINATION ASSESSMENT. - NSPASTMEDSURGHISTORY_GEN_ALL_CORE_FT
PAST MEDICAL & SURGICAL HISTORY:  Cancer of right kidney      History of BPH      HLD (hyperlipidemia)      Former smoker      CAD (coronary artery disease)      HTN (hypertension)      Lung disorder      H/O right nephrectomy      History of cardiac cath      H/O sleep apnea      History of nephrectomy  right      History of percutaneous coronary intervention

## 2024-03-18 NOTE — CONSULT NOTE ADULT - CONSULT REASON
R19.7   Diarrhea  I25.1     CAD  I10       HTN  N18.32 CKD, Cr 2.2  D63.8   Anemia due to chronic disease  D63.1   Anemia due to CKD  C78.0    Lung mets BL  C79.51   Bone mets R ilaic  C77.9    Mediastinal LN mets

## 2024-03-18 NOTE — CONSULT NOTE ADULT - ASSESSMENT
[ASSESSMENT and  PLAN]  R19.7   Diarrhea  I25.1     CAD  I10       HTN  N18.32 CKD, Cr 2.2  D63.8   Anemia due to chronic disease  D63.1   Anemia due to CKD  C78.0    Lung mets BL  C79.51   Bone mets R ilaic  C77.9    Mediastinal LN mets    63-year-old male with history of metastatic renal cell carcinoma. Bone mets, lung mets, lung mets.   Status post right nephrectomy with T1b tumor initially. Developed recurrence.   Initial workup noted lung lesion, which was too small to biopsy.    Subsequent follow-up with enlarging lesion s/p bx with RCC.     On chemotherapy with Keytruda-Yerovoy  every 4 weeks 01/16/24.   Last dose 3 weeks ago. 02/27/24  Admitted with diarrhea illness occuring 2-3wk post last tx.  Only possible exacerbating cause, related to cold chicken dish at several days before onset of diarrhea.  Symptoms appear to have worsened post Cipro Flagyl Rx on Friday.  Currently diarrhea symptoms appear to be abating.  Saturday Sunday had 5-10 watery bowel movements.  Yesterday had watery-soft bowel movements.  No fever.  CT scan negative for colon thickening.     Differential includes foodborne diarrhea illness eg Camphylobacter, gastrointestinal virus, versus chemotherapy induced diarrhea.  At current time favor the former.  GI PCR neg, CDiff neg. Although pt s/p doses of Cipro and Flagyl recently.       RECOMMENDATIONS    IV fluids  Encourage p.o. hydration  Monitor for improvement  Consider GI evaluation    If symptoms worsen consideration for repeat CT scan to reevaluate for colitis, and possible steroids if concern for chemo-induced diarrhea.    Chemotherapy planned this week, will be placed on hold pending improvement in symptoms.      DVT Prophylaxis  SQ Lovenox or SQ heparin    Discussed plan of care with patient and or family in detail.   Discussed with patient, wife and son.  Pt/Family expressed understanding of the treatment plan.   Risks, benefits and alternatives discussed in detail.   Opportunity given for questions and discussion.   Questions or concerns all addressed and answered to their satisfaction, and in lay terms.     Discussed with Dr Hay    Thank you for consulting us.     > 51  minutes spent in direct patient care, examining and counseling patient,  reviewing  the notes, lab data/ imaging , discussion with multidisciplinary team.

## 2024-03-19 ENCOUNTER — TRANSCRIPTION ENCOUNTER (OUTPATIENT)
Age: 64
End: 2024-03-19

## 2024-03-19 LAB
ANION GAP SERPL CALC-SCNC: 4 MMOL/L — LOW (ref 5–17)
BASOPHILS # BLD AUTO: 0.04 K/UL — SIGNIFICANT CHANGE UP (ref 0–0.2)
BASOPHILS NFR BLD AUTO: 0.4 % — SIGNIFICANT CHANGE UP (ref 0–2)
BUN SERPL-MCNC: 8 MG/DL — SIGNIFICANT CHANGE UP (ref 7–23)
CALCIUM SERPL-MCNC: 7.7 MG/DL — LOW (ref 8.5–10.1)
CHLORIDE SERPL-SCNC: 117 MMOL/L — HIGH (ref 96–108)
CO2 SERPL-SCNC: 21 MMOL/L — LOW (ref 22–31)
CREAT SERPL-MCNC: 1.2 MG/DL — SIGNIFICANT CHANGE UP (ref 0.5–1.3)
CULTURE RESULTS: SIGNIFICANT CHANGE UP
EGFR: 67 ML/MIN/1.73M2 — SIGNIFICANT CHANGE UP
EOSINOPHIL # BLD AUTO: 0.24 K/UL — SIGNIFICANT CHANGE UP (ref 0–0.5)
EOSINOPHIL NFR BLD AUTO: 2.5 % — SIGNIFICANT CHANGE UP (ref 0–6)
GLUCOSE SERPL-MCNC: 80 MG/DL — SIGNIFICANT CHANGE UP (ref 70–99)
HCT VFR BLD CALC: 35 % — LOW (ref 39–50)
HGB BLD-MCNC: 11.6 G/DL — LOW (ref 13–17)
IMM GRANULOCYTES NFR BLD AUTO: 4.2 % — HIGH (ref 0–0.9)
LYMPHOCYTES # BLD AUTO: 1.46 K/UL — SIGNIFICANT CHANGE UP (ref 1–3.3)
LYMPHOCYTES # BLD AUTO: 15.5 % — SIGNIFICANT CHANGE UP (ref 13–44)
MCHC RBC-ENTMCNC: 28.9 PG — SIGNIFICANT CHANGE UP (ref 27–34)
MCHC RBC-ENTMCNC: 33.1 GM/DL — SIGNIFICANT CHANGE UP (ref 32–36)
MCV RBC AUTO: 87.3 FL — SIGNIFICANT CHANGE UP (ref 80–100)
MONOCYTES # BLD AUTO: 1.4 K/UL — HIGH (ref 0–0.9)
MONOCYTES NFR BLD AUTO: 14.8 % — HIGH (ref 2–14)
NEUTROPHILS # BLD AUTO: 5.89 K/UL — SIGNIFICANT CHANGE UP (ref 1.8–7.4)
NEUTROPHILS NFR BLD AUTO: 62.6 % — SIGNIFICANT CHANGE UP (ref 43–77)
NRBC # BLD: 0 /100 WBCS — SIGNIFICANT CHANGE UP (ref 0–0)
PLATELET # BLD AUTO: 268 K/UL — SIGNIFICANT CHANGE UP (ref 150–400)
POTASSIUM SERPL-MCNC: 3.9 MMOL/L — SIGNIFICANT CHANGE UP (ref 3.5–5.3)
POTASSIUM SERPL-SCNC: 3.9 MMOL/L — SIGNIFICANT CHANGE UP (ref 3.5–5.3)
RBC # BLD: 4.01 M/UL — LOW (ref 4.2–5.8)
RBC # FLD: 13.2 % — SIGNIFICANT CHANGE UP (ref 10.3–14.5)
SODIUM SERPL-SCNC: 142 MMOL/L — SIGNIFICANT CHANGE UP (ref 135–145)
SPECIMEN SOURCE: SIGNIFICANT CHANGE UP
WBC # BLD: 9.43 K/UL — SIGNIFICANT CHANGE UP (ref 3.8–10.5)
WBC # FLD AUTO: 9.43 K/UL — SIGNIFICANT CHANGE UP (ref 3.8–10.5)

## 2024-03-19 PROCEDURE — 99233 SBSQ HOSP IP/OBS HIGH 50: CPT

## 2024-03-19 PROCEDURE — 99232 SBSQ HOSP IP/OBS MODERATE 35: CPT

## 2024-03-19 RX ORDER — LOPERAMIDE HCL 2 MG
2 TABLET ORAL THREE TIMES A DAY
Refills: 0 | Status: DISCONTINUED | OUTPATIENT
Start: 2024-03-19 | End: 2024-03-20

## 2024-03-19 RX ORDER — LACTOBACILLUS ACIDOPHILUS 100MM CELL
1 CAPSULE ORAL
Refills: 0 | Status: DISCONTINUED | OUTPATIENT
Start: 2024-03-19 | End: 2024-03-20

## 2024-03-19 RX ADMIN — CLOPIDOGREL BISULFATE 75 MILLIGRAM(S): 75 TABLET, FILM COATED ORAL at 12:14

## 2024-03-19 RX ADMIN — Medication 81 MILLIGRAM(S): at 12:14

## 2024-03-19 RX ADMIN — HEPARIN SODIUM 5000 UNIT(S): 5000 INJECTION INTRAVENOUS; SUBCUTANEOUS at 18:08

## 2024-03-19 RX ADMIN — HEPARIN SODIUM 5000 UNIT(S): 5000 INJECTION INTRAVENOUS; SUBCUTANEOUS at 06:02

## 2024-03-19 RX ADMIN — Medication 1 TABLET(S): at 18:08

## 2024-03-19 RX ADMIN — FAMOTIDINE 20 MILLIGRAM(S): 10 INJECTION INTRAVENOUS at 12:14

## 2024-03-19 RX ADMIN — Medication 2 MILLIGRAM(S): at 18:43

## 2024-03-19 RX ADMIN — ATORVASTATIN CALCIUM 20 MILLIGRAM(S): 80 TABLET, FILM COATED ORAL at 22:07

## 2024-03-19 NOTE — DISCHARGE NOTE PROVIDER - NSDCMRMEDTOKEN_GEN_ALL_CORE_FT
aspirin 81 mg oral tablet: 1 tab(s) orally once a day (in the morning)  clopidogrel 75 mg oral tablet: 1 tab(s) orally once a day (in the morning)  rosuvastatin 5 mg oral tablet: 1 tab(s) orally once a day (at bedtime)

## 2024-03-19 NOTE — DISCHARGE NOTE PROVIDER - CARE PROVIDER_API CALL
Hon Mariajose Montemayor  Medical Oncology  [address], [address]  [place] [address]  Phone: [phone]  Fax: [phone]  Follow Up Time: Routine    your primary care doctor,   Phone: (   )    -  Fax: (   )    -  Follow Up Time: 1 week

## 2024-03-19 NOTE — DIETITIAN INITIAL EVALUATION ADULT - PROBLEM SELECTOR PLAN 1
Patient presents with 8 day hx of diarrhea, poor PO intake, weakness, and fatigue   - S/P IV bolus x 3 and Ertapenem in Ed   - Lactate improved   - VSS   - CT abdomen with no acute pathology   - Continue maintenance fluids at 80 cc / hr   - F/u AM labs  - Will monitor off of abx for now, hold Cipro/Flagyll   - F/u stool cultures and C.diff PCR  - Full liquid diet   - Morphine PRN for abdominal pain   - ID consulted Dr. Wilson   - Continue isolation

## 2024-03-19 NOTE — DIETITIAN INITIAL EVALUATION ADULT - ORAL INTAKE PTA/DIET HISTORY
Pt reports decreased appetite/intake for >1 week. Pt states that he typically eats well, consumes 3 meals/day prepared by spouse. Regular diet at home. Spouse states that she prepares balanced meals. Pt has tried ensure supplements in the past and dislikes.

## 2024-03-19 NOTE — PHARMACOTHERAPY INTERVENTION NOTE - COMMENTS
Safe prescribing for age >65: Discussed morphine PRN order with Dr. Hay.  Not currently necessary per MD.  Will discontinue per discussion.

## 2024-03-19 NOTE — DISCHARGE NOTE PROVIDER - NSDCCPCAREPLAN_GEN_ALL_CORE_FT
PRINCIPAL DISCHARGE DIAGNOSIS  Diagnosis: Diarrhea  Assessment and Plan of Treatment: infectious work up negative. you were given IV hydration, antidiarrhea medication, advanced diet as tolerated.   follow up with primary care doctor  as discussed      SECONDARY DISCHARGE DIAGNOSES  Diagnosis: Weakness  Assessment and Plan of Treatment:     Diagnosis: Bandemia  Assessment and Plan of Treatment:     Diagnosis: PUNEET (acute kidney injury)  Assessment and Plan of Treatment: renal function normalized after hydration.  follow up with PCP     PRINCIPAL DISCHARGE DIAGNOSIS  Diagnosis: Diarrhea  Assessment and Plan of Treatment: infectious work up negative. you were given IV hydration, antidiarrhea medication, advanced diet as tolerated.   follow up with primary care doctor  as discussed      SECONDARY DISCHARGE DIAGNOSES  Diagnosis: Weakness  Assessment and Plan of Treatment: resolved sec to diarrhea .    Diagnosis: PUNEET (acute kidney injury)  Assessment and Plan of Treatment: renal function normalized after hydration.  follow up with PCP

## 2024-03-19 NOTE — PROGRESS NOTE ADULT - PROBLEM SELECTOR PLAN 2
Patient with kidney cancer s/p right nephrectomy in [facility] in 2023  Patient is on immunotherapy, received a total of 3 doses, last dose 3 weeks ago, next dose scheduled for 3/19   - Heme Onc Dr. Montemayor consulted, appreciate recs
Patient with kidney cancer s/p right nephrectomy in [facility] in 2023  Patient is on immunotherapy, received a total of 3 doses, last dose 3 weeks ago, next dose scheduled for 3/19   - Heme Onc Dr. Montemayor consulted, appreciate recs

## 2024-03-19 NOTE — PROGRESS NOTE ADULT - ASSESSMENT
[ASSESSMENT and  PLAN]  R19.7   Diarrhea  I25.1     CAD  I10       HTN  N18.32 CKD, Cr 2.2  D63.8   Anemia due to chronic disease  D63.1   Anemia due to CKD  C78.0    Lung mets BL  C79.51   Bone mets R ilaic  C77.9    Mediastinal LN mets    63-year-old male with history of metastatic renal cell carcinoma. Bone mets, lung mets, lung mets.   Status post right nephrectomy with T1b tumor initially. Developed recurrence.   Initial workup noted lung lesion, which was too small to biopsy.    Subsequent follow-up with enlarging lesion s/p bx with RCC.     On chemotherapy with Keytruda-Yerovoy  every 4 weeks 01/16/24.   Last dose 3 weeks ago. 02/27/24  Admitted with diarrhea illness occuring 2-3wk post last tx.  Only possible exacerbating cause, related to cold chicken dish at several days before onset of diarrhea.  Symptoms appear to have worsened post Cipro Flagyl Rx on Friday.  Currently diarrhea symptoms appear to be abating.  Saturday Sunday had 5-10 watery bowel movements.  Yesterday had watery-soft bowel movements.  No fever.  CT scan negative for colon thickening.     Differential includes foodborne diarrhea illness eg Camphylobacter, gastrointestinal virus, versus chemotherapy induced diarrhea.  At current time favor the former.  GI PCR neg, CDiff neg. Although pt s/p doses of Cipro and Flagyl recently.       RECOMMENDATIONS    -met renal cell ca on Ipi/Nivo, adm w diarrhea x 8 days and abdomen pain, CT no colitis, C dif negative. On adm mild leukocytosis and increased bands, no fever  -clinically better, much improved since admission, no further abdomen pain  -seen by ID  -continue supportive care from Heme/Onc standpoint  -advance diet as indicated
63 M hx former smoker, cancer right kidney s/p nephrectomy (in [place]), cad s/p stents (on asa/plavix), bph, htn, hld c/o diarrhea for the past 8 days leading to feeling dehydrated along with abd discomfort. 
63 M hx former smoker, cancer right kidney s/p nephrectomy (in [place]), cad s/p stents (on asa/plavix), bph, htn, hld c/o diarrhea for the past 8 days leading to feeling dehydrated along with abd discomfort.

## 2024-03-19 NOTE — PROGRESS NOTE ADULT - NUTRITIONAL ASSESSMENT
This patient has been assessed with a concern for Malnutrition and has been determined to have a diagnosis/diagnoses of Moderate protein-calorie malnutrition.    This patient is being managed with:   Diet Regular-  Fiber/Residue Restricted  Soft and Bite Sized (SOFTBTSZ)  Entered: Mar 19 2024  1:58PM

## 2024-03-19 NOTE — PROGRESS NOTE ADULT - SUBJECTIVE AND OBJECTIVE BOX
[facility]  INFECTIOUS DISEASES   [address] [place] [address]  Tel: [phone]     Fax: [phone]  ========================================================  MD Rowdy Lawson Kaushal, MD Cho, Michelle, MD Sunjit, Jaspal, MD  ========================================================    [id number]  MARKUS SPENCE     Follow up: Diarrhea    Diarrhea is improving, had once last night and once today so far. Used to be 10-15 times daily.  No fever.     PAST MEDICAL & SURGICAL HISTORY:  Lung disorder  HTN (hypertension)  CAD (coronary artery disease)  Former smoker  HLD (hyperlipidemia)  History of BPH  Cancer of right kidney  History of nephrectomy  right  H/O sleep apnea  History of cardiac cath  H/O right nephrectomy  History of percutaneous coronary intervention    Social Hx: Former smoking, No EtOH or drugs     FAMILY HISTORY:  FH: HTN (hypertension) (Mother)  FH: type 2 diabetes (Father)    Allergies  penicillin (Rash)    MEDICATIONS  (STANDING):  aspirin  chewable 81 milliGRAM(s) Oral daily  atorvastatin 20 milliGRAM(s) Oral at bedtime  clopidogrel Tablet 75 milliGRAM(s) Oral daily  heparin   Injectable 5000 Unit(s) SubCutaneous every 12 hours  lactated ringers. 1000 milliLiter(s) (80 mL/Hr) IV Continuous <Continuous>    MEDICATIONS  (PRN):  acetaminophen     Tablet .. 650 milliGRAM(s) Oral every 6 hours PRN Temp greater or equal to 38C (100.4F), Mild Pain (1 - 3)  aluminum hydroxide/magnesium hydroxide/simethicone Suspension 30 milliLiter(s) Oral every 4 hours PRN Dyspepsia  melatonin 3 milliGRAM(s) Oral at bedtime PRN Insomnia  morphine  - Injectable 2 milliGRAM(s) IV Push every 4 hours PRN Moderate Pain (4 - 6)  ondansetron Injectable 4 milliGRAM(s) IV Push every 8 hours PRN Nausea and/or Vomiting     REVIEW OF SYSTEMS:  CONSTITUTIONAL:  No Fever or chills  HEENT:  No diplopia or blurred vision.  No sore throat or runny nose.  CARDIOVASCULAR:  No chest pain   RESPIRATORY:  No cough, shortness of breath, PND or orthopnea.  GASTROINTESTINAL:  No nausea, vomiting, + diarrhea.  GENITOURINARY:  No dysuria, frequency or urgency. No Blood in urine  MUSCULOSKELETAL:  no joint aches, no muscle pain  SKIN:  No change in skin, hair or nails.    Physical Exam:  Vital Signs Last 24 Hrs  T(C): 37.1 (19 Mar 2024 12:25), Max: 37.2 (18 Mar 2024 22:20)  T(F): 98.8 (19 Mar 2024 12:25), Max: 99 (18 Mar 2024 22:20)  HR: 76 (19 Mar 2024 12:25) (75 - 81)  BP: 124/71 (19 Mar 2024 12:25) (124/71 - 134/70)  BP(mean): --  RR: 18 (19 Mar 2024 12:25) (18 - 18)  SpO2: 96% (19 Mar 2024 12:25) (94% - 97%)  Parameters below as of 19 Mar 2024 12:25  Patient On (Oxygen Delivery Method): room air  GEN: NAD  HEENT: normocephalic and atraumatic. EOMI. PERRL.    NECK: Supple.  No lymphadenopathy   LUNGS: Clear to auscultation.  HEART: Regular rate and rhythm   ABDOMEN: Soft, nontender, and nondistended.  Positive bowel sounds.    : No CVA tenderness  EXTREMITIES: Without edema.  NEUROLOGIC: grossly intact.  PSYCHIATRIC: Appropriate affect .  SKIN: No rash     Labs:                        11.6   9.43  )-----------( 268      ( 19 Mar 2024 06:00 )             35.0     03-19    142  |  117<H>  |  8   ----------------------------<  80  3.9   |  21<L>  |  1.20    Ca    7.7<L>      19 Mar 2024 06:00    TPro  5.1<L>  /  Alb  2.0<L>  /  TBili  0.2  /  DBili  x   /  AST  44<H>  /  ALT  67  /  AlkPhos  44  03-18    Culture - Urine (collected 03-17-24 @ 18:50)  Source: Clean Catch Clean Catch (Midstream)  Final Report (03-18-24 @ 16:25):    No growth    Culture - Stool (collected 03-17-24 @ 18:50)  Source: .Stool Feces  Preliminary Report (03-18-24 @ 16:34):    No enteric pathogens to date: Final culture pending    No enteric gram negative rods isolated    Culture - Blood (collected 03-17-24 @ 14:45)  Source: .Blood Blood-Peripheral  Preliminary Report (03-18-24 @ 19:02):    No growth at 24 hours    Culture - Blood (collected 03-17-24 @ 14:40)  Source: .Blood Blood-Peripheral  Preliminary Report (03-18-24 @ 19:02):    No growth at 24 hours    WBC Count: 9.43 K/uL (03-19-24 @ 06:00)  WBC Count: 11.00 K/uL (03-18-24 @ 06:10)  WBC Count: 11.88 K/uL (03-17-24 @ 14:45)    Creatinine: 1.20 mg/dL (03-19-24 @ 06:00)  Creatinine: 1.20 mg/dL (03-18-24 @ 06:10)  Creatinine: 1.40 mg/dL (03-17-24 @ 14:45)    SARS-CoV-2: NotDetec (03-17-24 @ 16:28)    All imaging and other data have been reviewed.  < from: CT Abdomen and Pelvis No Cont (03.17.24 @ 16:09) >  ACC: [id number] EXAM:  CT ABDOMEN AND PELVIS   ORDERED BY: BELGICA AMEZCUA   PROCEDURE DATE:  03/17/2024    INTERPRETATION:  CLINICAL INFORMATION: 63 year-old male with metastatic   right renal cell carcinoma status post right nephrectomy. Presents with   weakness and diarrhea  COMPARISON: PET CT 3/11/2024  CONTRAST/COMPLICATIONS:  IV Contrast: None  Oral Contrast: None  Complications: None  PROCEDURE:  CT of the Abdomen and Pelvis was performed.  Sagittal and coronal reformats were performed.  FINDINGS:  LOWER CHEST: Clear.  LIVER: Scattered small hypodensities.  BILE DUCTS: Nondilated.  GALLBLADDER: Sludge.  SPLEEN: Normal.  PANCREAS: Normal.  ADRENALS: Normal.  KIDNEYS/URETERS: Status post right nephrectomy. No hydronephrosis or   urinary tract calculi in the left kidney.  BLADDER: Normal.  REPRODUCTIVE ORGANS: Nonenlarged.  BOWEL: No bowel obstruction or bowel inflammation.  PERITONEUM: No free air or ascites.  VESSELS: Aortoiliac atherosclerosis without aneurysm.  RETROPERITONEUM/LYMPH NODES: No adenopathy.  ABDOMINAL WALL: Normal.  BONES: No acute bony abnormality. Stable lytic lesion at the right   anterior iliac bone.  IMPRESSION:  *  No acute pathology.  *  Additional stable findings as described above.    Assessment and Plan:   62yo man with PMH of HTN, BPH, CAD, former smoker, right kidney malignancy s/p nephrectomy (in [facility]), on immunotherapy was admitted with 10-15 episodes of diarrhea for 8 days.   He is nauseous but had once vomited.  He has started immunotherapy in 1/2024 and in 2/2024 also had diarrhea after treatment but just for one day.    No recent antibiotics prior to diarrhea, no sick contact. Lives with wife who is healthy. No pets. No recent travel, last one in 5/2023.     Unclear if this is a side effect from Immunotherapy or not since infectious gastroenteritis are rule out with negative GI PCR and C diff. CT with no colitis.   Has improved significantly from 10-15 episodes of diarrhea now had only once since this morning.     # Diarrhea  # renal malignancy on immunotherapy    - Urine, stool and blood cultures all so far negative   - GI PCR and C diff both negative   - No fever, leukocytosis 11-->9 today   - Watch closely off antibiotics   - Hem/onc consult noted   - Advance diet     Will sign off please call with any question.    Carolee Wilson MD  Division of Infectious Diseases   Please call ID service at [phone] with any question.    50 minutes spent on total encounter assessing patient, examination, chart review, counseling and coordinating care by the attending physician/nurse/care manager.  
All interim records and events noted.    feeling improve, only one BM so far today, mixed soft and watery  yesterday 2 episodes in evening, one soft and one watery  no further abdomen pain  would like to eat      MEDICATIONS  (STANDING):  aspirin enteric coated 81 milliGRAM(s) Oral daily  atorvastatin 20 milliGRAM(s) Oral at bedtime  clopidogrel Tablet 75 milliGRAM(s) Oral daily  famotidine Injectable 20 milliGRAM(s) IV Push daily  heparin   Injectable 5000 Unit(s) SubCutaneous every 12 hours  lactated ringers. 1000 milliLiter(s) (80 mL/Hr) IV Continuous <Continuous>    MEDICATIONS  (PRN):  acetaminophen     Tablet .. 650 milliGRAM(s) Oral every 6 hours PRN Temp greater or equal to 38C (100.4F), Mild Pain (1 - 3)  aluminum hydroxide/magnesium hydroxide/simethicone Suspension 30 milliLiter(s) Oral every 4 hours PRN Dyspepsia  melatonin 3 milliGRAM(s) Oral at bedtime PRN Insomnia  morphine  - Injectable 2 milliGRAM(s) IV Push every 4 hours PRN Moderate Pain (4 - 6)  ondansetron Injectable 4 milliGRAM(s) IV Push every 8 hours PRN Nausea and/or Vomiting      Vital Signs Last 24 Hrs  T(C): 37.1 (19 Mar 2024 05:10), Max: 37.2 (18 Mar 2024 22:20)  T(F): 98.8 (19 Mar 2024 05:10), Max: 99 (18 Mar 2024 22:20)  HR: 75 (19 Mar 2024 05:10) (75 - 86)  BP: 134/70 (19 Mar 2024 05:10) (115/66 - 134/70)  BP(mean): --  RR: 18 (19 Mar 2024 05:10) (18 - 18)  SpO2: 94% (19 Mar 2024 05:10) (94% - 97%)    Parameters below as of 19 Mar 2024 05:10  Patient On (Oxygen Delivery Method): room air        PHYSICAL EXAM  General: in no acute distress  Head: atraumatic, normocephalic  ENT: sclera anicteric, buccal mucosa moist  Neck: supple, trachea midline  CV: S1 S2, regular rate and rhythm  Lungs: clear to auscultation, no wheezes/rhonchi  Abdomen: soft, nontender, bowel sounds present, no palpable masses  Extrem: no clubbing/cyanosis/edema  Skin: no significant increased ecchymosis/petechiae  Neuro: alert and oriented X3,  no focal deficits      LABS:             11.6   9.43  )-----------( 268      ( 03-19 @ 06:00 )             35.0                11.5   11.00 )-----------( 248      ( 03-18 @ 06:10 )             35.1                13.9   11.88 )-----------( 285      ( 03-17 @ 14:45 )             42.3       03-19    142  |  117<H>  |  8   ----------------------------<  80  3.9   |  21<L>  |  1.20    Ca    7.7<L>      19 Mar 2024 06:00    TPro  5.1<L>  /  Alb  2.0<L>  /  TBili  0.2  /  DBili  x   /  AST  44<H>  /  ALT  67  /  AlkPhos  44  03-18 03-17 @ 14:45  PT11.6 INR0.99  PTT31.0      RADIOLOGY & ADDITIONAL STUDIES:    IMPRESSION/RECOMMENDATIONS:
Patient is a 63y old  Male who presents with a chief complaint of diarrhea/weakness (18 Mar 2024 10:42)      Subjective:  INTERVAL HPI/OVERNIGHT EVENTS: Patient seen and examined at bedside.  Patient feels better, diarrhea has slow down ,no n/v  MEDICATIONS  (STANDING):  aspirin enteric coated 81 milliGRAM(s) Oral daily  atorvastatin 20 milliGRAM(s) Oral at bedtime  clopidogrel Tablet 75 milliGRAM(s) Oral daily  famotidine Injectable 20 milliGRAM(s) IV Push daily  heparin   Injectable 5000 Unit(s) SubCutaneous every 12 hours  lactated ringers. 1000 milliLiter(s) (80 mL/Hr) IV Continuous <Continuous>    MEDICATIONS  (PRN):  acetaminophen     Tablet .. 650 milliGRAM(s) Oral every 6 hours PRN Temp greater or equal to 38C (100.4F), Mild Pain (1 - 3)  aluminum hydroxide/magnesium hydroxide/simethicone Suspension 30 milliLiter(s) Oral every 4 hours PRN Dyspepsia  melatonin 3 milliGRAM(s) Oral at bedtime PRN Insomnia  morphine  - Injectable 2 milliGRAM(s) IV Push every 4 hours PRN Moderate Pain (4 - 6)  ondansetron Injectable 4 milliGRAM(s) IV Push every 8 hours PRN Nausea and/or Vomiting      Allergies    penicillin (Rash)    Intolerances        REVIEW OF SYSTEMS:  CONSTITUTIONAL: No fever or chills  HEENT:  No headache, no sore throat  RESPIRATORY: No cough or shortness of breath  CARDIOVASCULAR: No chest pain or palpitations  GASTROINTESTINAL: No abd pain, nausea, vomiting, or diarrhea      Objective:  Vital Signs Last 24 Hrs  T(C): 36.6 (18 Mar 2024 12:46), Max: 37.1 (17 Mar 2024 22:53)  T(F): 97.8 (18 Mar 2024 12:46), Max: 98.8 (17 Mar 2024 22:53)  HR: 86 (18 Mar 2024 12:46) (86 - 90)  BP: 115/66 (18 Mar 2024 12:46) (115/66 - 159/76)  BP(mean): --  RR: 18 (18 Mar 2024 12:46) (16 - 19)  SpO2: 97% (18 Mar 2024 12:46) (96% - 99%)    Parameters below as of 18 Mar 2024 12:46  Patient On (Oxygen Delivery Method): room air        GENERAL: NAD, lying in bed comfortably  HEAD:  Normocephalic  EYES:  conjunctiva and sclera clear  ENT: Moist mucous membranes  NECK: Supple  CHEST/LUNG: Clear to auscultation bilaterally; No rales or rhonchi; no wheezing. Unlabored respirations  HEART: Regular rate and rhythm; S1S2+  ABDOMEN: Bowel sounds present; Soft, Nontender, Nondistended.   EXTREMITIES:  + distal Peripheral Pulses;  No cyanosis, or edema  NERVOUS SYSTEM:  Alert & Oriented X3;  No gross focal deficits   MSK: moves all extremities  SKIN: No rashes     LABS:                        11.5   11.00 )-----------( 248      ( 18 Mar 2024 06:10 )             35.1     18 Mar 2024 06:10    146    |  119    |  9      ----------------------------<  80     4.0     |  20     |  1.20     Ca    7.4        18 Mar 2024 06:10    TPro  5.1    /  Alb  2.0    /  TBili  0.2    /  DBili  x      /  AST  44     /  ALT  67     /  AlkPhos  44     18 Mar 2024 06:10    PT/INR - ( 17 Mar 2024 14:45 )   PT: 11.6 sec;   INR: 0.99 ratio         PTT - ( 17 Mar 2024 14:45 )  PTT:31.0 sec  Urinalysis Basic - ( 18 Mar 2024 06:10 )    Color: x / Appearance: x / SG: x / pH: x  Gluc: 80 mg/dL / Ketone: x  / Bili: x / Urobili: x   Blood: x / Protein: x / Nitrite: x   Leuk Esterase: x / RBC: x / WBC x   Sq Epi: x / Non Sq Epi: x / Bacteria: x      CAPILLARY BLOOD GLUCOSE              RADIOLOGY & ADDITIONAL TESTS:    Personally reviewed.     Consultant(s) Notes Reviewed:  [x] YES  [ ] NO    Plan of care discussed with patient /family wife at bedside; all questions answered  
Patient is a 63y old  Male who presents with a chief complaint of Diarrhea     (19 Mar 2024 10:45)      Subjective:  INTERVAL HPI/OVERNIGHT EVENTS: Patient seen and examined at bedside.  Patient states he had 3 episodes diarrhea earlier morning, no n/v/abd pain   MEDICATIONS  (STANDING):  aspirin enteric coated 81 milliGRAM(s) Oral daily  atorvastatin 20 milliGRAM(s) Oral at bedtime  clopidogrel Tablet 75 milliGRAM(s) Oral daily  famotidine Injectable 20 milliGRAM(s) IV Push daily  heparin   Injectable 5000 Unit(s) SubCutaneous every 12 hours  lactated ringers. 1000 milliLiter(s) (80 mL/Hr) IV Continuous <Continuous>    MEDICATIONS  (PRN):  acetaminophen     Tablet .. 650 milliGRAM(s) Oral every 6 hours PRN Temp greater or equal to 38C (100.4F), Mild Pain (1 - 3)  aluminum hydroxide/magnesium hydroxide/simethicone Suspension 30 milliLiter(s) Oral every 4 hours PRN Dyspepsia  melatonin 3 milliGRAM(s) Oral at bedtime PRN Insomnia  morphine  - Injectable 2 milliGRAM(s) IV Push every 4 hours PRN Moderate Pain (4 - 6)  ondansetron Injectable 4 milliGRAM(s) IV Push every 8 hours PRN Nausea and/or Vomiting      Allergies    penicillin (Rash)    Intolerances        REVIEW OF SYSTEMS:  CONSTITUTIONAL: No fever or chills  HEENT:  No headache, no sore throat  RESPIRATORY: No cough or shortness of breath  CARDIOVASCULAR: No chest pain or palpitations  GASTROINTESTINAL: No abd pain, nausea, vomiting, or diarrhea      Objective:  Vital Signs Last 24 Hrs  T(C): 37.1 (19 Mar 2024 12:25), Max: 37.2 (18 Mar 2024 22:20)  T(F): 98.8 (19 Mar 2024 12:25), Max: 99 (18 Mar 2024 22:20)  HR: 76 (19 Mar 2024 12:25) (75 - 81)  BP: 124/71 (19 Mar 2024 12:25) (124/71 - 134/70)  BP(mean): --  RR: 18 (19 Mar 2024 12:25) (18 - 18)  SpO2: 96% (19 Mar 2024 12:25) (94% - 97%)    Parameters below as of 19 Mar 2024 12:25  Patient On (Oxygen Delivery Method): room air        GENERAL: NAD, siting in bed   HEAD:  Normocephalic  EYES:  conjunctiva and sclera clear  ENT: Moist mucous membranes  NECK: Supple  CHEST/LUNG: Clear to auscultation bilaterally; No rales or rhonchi; no wheezing. Unlabored respirations  HEART: Regular rate and rhythm; S1S2+  ABDOMEN: Bowel sounds present; Soft, Nontender, Nondistended.   EXTREMITIES:  + distal Peripheral Pulses;  No cyanosis, or edema  NERVOUS SYSTEM:  Alert & Oriented X3;  No gross focal deficits   MSK: moves all extremities  SKIN: No rashes     LABS:                        11.6   9.43  )-----------( 268      ( 19 Mar 2024 06:00 )             35.0     19 Mar 2024 06:00    142    |  117    |  8      ----------------------------<  80     3.9     |  21     |  1.20     Ca    7.7        19 Mar 2024 06:00      PT/INR - ( 17 Mar 2024 14:45 )   PT: 11.6 sec;   INR: 0.99 ratio         PTT - ( 17 Mar 2024 14:45 )  PTT:31.0 sec  Urinalysis Basic - ( 19 Mar 2024 06:00 )    Color: x / Appearance: x / SG: x / pH: x  Gluc: 80 mg/dL / Ketone: x  / Bili: x / Urobili: x   Blood: x / Protein: x / Nitrite: x   Leuk Esterase: x / RBC: x / WBC x   Sq Epi: x / Non Sq Epi: x / Bacteria: x      CAPILLARY BLOOD GLUCOSE            Culture - Urine (collected 03-17-24 @ 18:50)  Source: Clean Catch Clean Catch (Midstream)  Final Report (03-18-24 @ 16:25):    No growth    Culture - Stool (collected 03-17-24 @ 18:50)  Source: .Stool Feces  Preliminary Report (03-18-24 @ 16:34):    No enteric pathogens to date: Final culture pending    No enteric gram negative rods isolated    Culture - Blood (collected 03-17-24 @ 14:45)  Source: .Blood Blood-Peripheral  Preliminary Report (03-18-24 @ 19:02):    No growth at 24 hours    Culture - Blood (collected 03-17-24 @ 14:40)  Source: .Blood Blood-Peripheral  Preliminary Report (03-18-24 @ 19:02):    No growth at 24 hours        RADIOLOGY & ADDITIONAL TESTS:    Personally reviewed.     Consultant(s) Notes Reviewed:  [x] YES  [ ] NO    Plan of care discussed with patient /family wife at bedside ; all questions answered

## 2024-03-19 NOTE — DIETITIAN INITIAL EVALUATION ADULT - ETIOLOGY
related to decreased ability to consume sufficient energy related to alteration in GI tract function

## 2024-03-19 NOTE — DISCHARGE NOTE PROVIDER - PROVIDER TOKENS
PROVIDER:[TOKEN:[[id number]],FOLLOWUP:[Routine]],FREE:[LAST:[your primary care doctor],PHONE:[(   )    -],FAX:[(   )    -],FOLLOWUP:[1 week]]

## 2024-03-19 NOTE — DIETITIAN INITIAL EVALUATION ADULT - SIGNS/SYMPTOMS
as evidenced by N/V, diarrhea.  as evidenced by <75% of EER for >7 days, 1-2% wt loss x 1 week, mild muscle depletion.

## 2024-03-19 NOTE — DIETITIAN INITIAL EVALUATION ADULT - HEIGHT FOR BMI (INCHES)
Judi Oh is a 17 m.o. female who is being evaluated via a billable video visit.      How would you like to obtain your AVS? MyChart.  If dropped from the video visit, the video invitation should be resent by: Text to cell phone: [phone]  Will anyone else be joining your video visit? No      Video Start Time: 12:59 PM    Health system Pediatrics Acute Visit     Judi Oh is a 17 m.o. female presenting over video call with mom, Nhi, and dad, Ernesto, to discuss a concern about:       CHIEF COMPLAINT:  Chief Complaint   Patient presents with     Fever     100.2 forhead for 4 days, low energy, mother is giving her Tylenol for the last3 days     Fussy         ASSESSMENT:    1. Viral illness       Fever likely caused by a viral illness. I am reassured that the fever has resolved and Judi is acting well today. Discussed concerning symptoms requiring follow up. Parents declined covid-19 testing for Judi, understand CDC guideline of quarantine for 10 days from onset of symptoms + 24 hours fever free without the use of antipyretics.     Discussed using mometasone 0.1% cream twice daily + liberal Vaseline over red, bothersome spot on inner thigh, follow up if this does not improve in the next 2 weeks.     PLAN:  Patient Instructions   I am reassured that Judi's fever is gone today and that she is acting playful and well.   Please let us know if new, concerning symptoms come up or if her fever returns.     For the spot on her leg, use mometasone 0.1% cream twice daily for up to 2 weeks, then liberally apply aquaphor or vaseline on top. Send a message if this gets worse or doesn't get better.             HISTORY OF PRESENT ILLNESS:    Symptoms started on Saturday, 4 days ago, with fever of 101. Fever peaked on Sunday at 103.9. Yesterday she had a Tmax of 101, was fever free by the evening. Today temp is 98. She has not had Tylenol or ibuprofen today.  No vomiting or diarrhea. She previously lost her appetite but  it is back today. She is drinking fluids normally.    He slept fine last night.  She never had a runny nose or congestion. No cough or difficulty breathing at any point. No complaints of pain.   Everyone else has been well at home. Mom has had the first dose of the vaccine. No known exposures to anyone sick. Parents have not had symptoms. Everyone has been isolated at home since Judi first became symptomatic, they plan to continue this for 2 weeks.    Parents notice that she gotten a fever after being exposed to a dog licking her on three different occasions when visiting family, wonder if this was a coincidence.   For the last 3 weeks - 1 month she has had a rash on her leg where the diaper rubs. It has been stubborn, comes and goes. Right now it is the worst it has been.   She did get better but has been irritable.      She is acting well today.      A complete ROS, other than the HPI, was reviewed and was negative.       Past Medical History:   Diagnosis Date     Single delivery by  2019       Family History   Problem Relation Age of Onset     Heart disease Mother      Anxiety disorder Mother      Depression Mother      No Medical Problems Father        No past surgical history on file.      MEDICATIONS:  Current Outpatient Medications   Medication Sig Dispense Refill     clotrimazole (LOTRIMIN) 1 % external solution Apply in a thin layer over the affected area on her diaper 4 times per day for about 1 week. 30 mL 0     mometasone (ELOCON) 0.1 % cream Apply to affected areas on body 1-2 times daily, avoid groin and face 45 g 1     No current facility-administered medications for this visit.             VITALS:  There were no vitals filed for this visit.  Wt Readings from Last 3 Encounters:   20 22 lb 12.5 oz (10.3 kg) (69 %, Z= 0.50)*   20 21 lb 5 oz (9.667 kg) (52 %, Z= 0.04)*   20 21 lb 5.5 oz (9.681 kg) (72 %, Z= 0.59)*     * Growth percentiles are based on WHO (Girls, 0-2  years) data.     There is no height or weight on file to calculate BMI.        Limited PHYSICAL EXAM via video chat:  General: Alert, interactive, in no acute distress  Eyes:   No eye drainage. Conjunctiva moist and pink.   Nose: No active nasal congestion. No nasal flaring.  Mouth: Lips pink. Oral mucosa appears moist.       Respiratory: No visible retractions, breathing at a regular rate and rhythm, no audible stridor.   Skin: 2 x 2 in dry, red spot on inner thigh              This visit was completed virtually by video call due to the coronavirus pandemic in an effort to minimize risk of transmission by limiting clinic visits.       DIONE Carbajal, IBCLC  02/03/21          Video-Visit Details    Type of service:  Video Visit    Video End Time (time video stopped): 1:25 PM  Originating Location (pt. Location): Home    Distant Location (provider location):  [facility]     Platform used for Video Visit: [employer]     0

## 2024-03-19 NOTE — DISCHARGE NOTE PROVIDER - HOSPITAL COURSE
ADMISSION DATE:  03-17-24    ---  FROM ADMISSION H+P:   HPI:  63 M hx former smoker, cancer right kidney s/p nephrectomy (in [place]), cad s/p stents (on asa/plavix), bph, htn, hld c/o diarrhea for the past 8 days leading to feeling dehydrated along with abd discomfort. He also reports some weight loss. He states that he has also been nauseous and had a few episodes of emesis. He states that he last vomited this afternoon. His last episode of diarrhea was around 7 pm. Patient reports feeling better receiving IV fluids. His PMD prescribed Cipro and Flagyl which has provided no relief. Patient has right chest port. He receives immunotherapy chemo (total 3 doses, last was 3 weeks ago, next dose due this upcoming Tuesday). He denies fever, cp, sob, constipation, and sick contacts. He reports chills, abd pain, N/V and diarrhea.   Wife present at bedside.     ED course:   Vitals: T 97.8 HR 90 /89  Labs: wbc 11.88 Lactate 2.8--> 1.7   Imaging:   CT abd/pelvis: *  No acute pathology, Additional stable findings as described above.  PET: 1.  Diffuse hypermetabolic uptake at thickened duodenum. Correlate   clinically. Repeat MRI of the abdomen may be helpful.  2.  Focus of increased uptake in the RIGHT paravertebral musculature   posterior to the lumbar spine is nonspecific; MRI may be obtained for   further assessment.  3.  Improved size and activity at lymph nodes in the chest and pulmonary   nodules.  4.  Improved size and activity at previously seen destructive anterior   RIGHT iliac bone lesion.  5.  No distinct abnormal uptake at RIGHT nephrectomy site.  Given IV bolus x3, Ertapenem x1      (17 Mar 2024 20:03)      ---  HOSPITAL COURSE/PERTINENT LABS/PROCEDURES PERFORMED/PENDING TESTS:   Pt was admitted for Diarrhea, PUNEET: renal function improved after IV hydration. infectious work up: GI PCR/C.diff both negative. urine culture , blood culture negative. preliminary stool  culture negative for Gram Negative Rods or enteric pathogen. loperamide and probiotic given, diarrhea improved. ID consulted, watched off antibiotic.         Patient is stable for discharge as per primary medical team and consultants.      Patient showed improvement throughout hospitalization. Patient was seen and examined on day of discharge. Patient was medically optimized for discharge with close outpatient follow up.    ---  PATIENT CONDITION:  - stable    --  VITALS:   T(C): 37.1 (03-19-24 @ 12:25), Max: 37.2 (03-18-24 @ 22:20)  HR: 76 (03-19-24 @ 12:25) (75 - 81)  BP: 124/71 (03-19-24 @ 12:25) (124/71 - 134/70)  RR: 18 (03-19-24 @ 12:25) (18 - 18)  SpO2: 96% (03-19-24 @ 12:25) (94% - 97%)    PHYSICAL EXAM ON DAY OF DISCHARGE:    ---  CONSULTANTS:   Hematology-Oncology   ID        ADMISSION DATE:  03-17-24    ---  FROM ADMISSION H+P:   HPI:  63 M hx former smoker,  cad s/p stents (on asa/plavix), bph, htn, hld c/o diarrhea for the past 8 days leading to feeling dehydrated along with abd discomfort. He also reports some weight loss. He states that he has also been nauseous and had a few episodes of emesis. He states that he last vomited this afternoon. His last episode of diarrhea was around 7 pm. Patient reports feeling better receiving IV fluids. His PMD prescribed Cipro and Flagyl which has provided no relief. Patient has right chest port. He receives immunotherapy chemo (total 3 doses, last was 3 weeks ago, next dose due this upcoming Tuesday). He denies fever, cp, sob, constipation, and sick contacts. He reports chills, abd pain, N/V and diarrhea.   Wife present at bedside.     ED course:   Vitals: T 97.8 HR 90 /89  Labs: wbc 11.88 Lactate 2.8--> 1.7   Imaging:   CT abd/pelvis: *  No acute pathology, Additional stable findings as described above.  PET: 1.  Diffuse hypermetabolic uptake at thickened duodenum. Correlate   clinically. Repeat MRI of the abdomen may be helpful.  2.  Focus of increased uptake in the RIGHT paravertebral musculature   posterior to the lumbar spine is nonspecific; MRI may be obtained for   further assessment.  3.  Improved size and activity at lymph nodes in the chest and pulmonary   nodules.  4.  Improved size and activity at previously seen destructive anterior   RIGHT iliac bone lesion.  5.  No distinct abnormal uptake at RIGHT nephrectomy site.  Given IV bolus x3, Ertapenem x1      (17 Mar 2024 20:03)      ---  HOSPITAL COURSE/PERTINENT LABS/PROCEDURES PERFORMED/PENDING TESTS: cancer right kidney s/p nephrectomy (in [place]),  renal malignancy on immunotherapy  Pt was admitted for Diarrhea,   dehydration  cause  of PUNEET   and mild hypernatremia  renal function improved  and hypernatremia   after IV hydration  improved . infectious work up: GI PCR/C.diff both negative. urine culture , blood culture negative. preliminary stool  culture negative for  enteric pathogen. loperamide and probiotic given, diarrhea improved. ID consulted   noninfectious   diarrhea , watched off antibiotic.   -No fever, leukocytosis  resolved    and   Watch closely off antibiotics .   Patient is stable for discharge as per primary medical team and consultants.  Patient showed improvement throughout hospitalization.   Patient was medically optimized for discharge with close outpatient follow up.   physical exam  day 3/20/24  Vital Signs Last 24 Hrs  T(C): 36.8 (20 Mar 2024 05:10), Max: 37.1 (19 Mar 2024 12:25)  T(F): 98.3 (20 Mar 2024 05:10), Max: 98.8 (19 Mar 2024 12:25)  HR: 65 (20 Mar 2024 05:10) (65 - 76)  BP: 122/63 (20 Mar 2024 05:10) (122/63 - 138/84)  BP(mean): --  RR: 18 (20 Mar 2024 05:10) (18 - 18)  SpO2: 94% (20 Mar 2024 05:10) (94% - 96%)    Parameters below as of 20 Mar 2024 05:10  Patient On (Oxygen Delivery Method): room air      VITALS:   T(C): 37.1 (03-19-24 @ 12:25), Max: 37.2 (03-18-24 @ 22:20)  HR: 76 (03-19-24 @ 12:25) (75 - 81)  BP: 124/71 (03-19-24 @ 12:25) (124/71 - 134/70)  RR: 18 (03-19-24 @ 12:25) (18 - 18)  SpO2: 96% (03-19-24 @ 12:25) (94% - 97%)    PHYSICAL EXAM   ---GENERAL: NAD,   HEAD:  Normocephalic  EYES:  conjunctiva and sclera clear  ENT: Moist mucous membranes  NECK: Supple  CHEST/LUNG:   bilaterally; No rales or rhonchi; no wheezing.   HEART: Regular rate and rhythm; S1S2+  ABDOMEN: Bowel sounds present; Soft, Nontender, Nondistended.   EXTREMITIES:  +  Peripheral Pulses;  No cyanosis, or edema  NERVOUS SYSTEM:  Alert & Oriented X3;  sensory / motor intact    MSK: moves all extremities  SKIN: No rashes     total spend 50  min.

## 2024-03-19 NOTE — DIETITIAN INITIAL EVALUATION ADULT - PERTINENT LABORATORY DATA
03-19    142  |  117<H>  |  8   ----------------------------<  80  3.9   |  21<L>  |  1.20    Ca    7.7<L>      19 Mar 2024 06:00    TPro  5.1<L>  /  Alb  2.0<L>  /  TBili  0.2  /  DBili  x   /  AST  44<H>  /  ALT  67  /  AlkPhos  44  03-18

## 2024-03-19 NOTE — DIETITIAN INITIAL EVALUATION ADULT - ADD RECOMMEND
1) Recommend advancing diet to regular diet as medically feasible  2) Once diet advances, will provide in-house protein smoothie daily (~200kcal, 20gm protein)  3) Recommend MVI daily  4) Monitor po intake, diet tolerance, weight trends, labs, GI function, skin integrity

## 2024-03-19 NOTE — DIETITIAN INITIAL EVALUATION ADULT - PERTINENT MEDS FT
MEDICATIONS  (STANDING):  aspirin enteric coated 81 milliGRAM(s) Oral daily  atorvastatin 20 milliGRAM(s) Oral at bedtime  clopidogrel Tablet 75 milliGRAM(s) Oral daily  famotidine Injectable 20 milliGRAM(s) IV Push daily  heparin   Injectable 5000 Unit(s) SubCutaneous every 12 hours  lactated ringers. 1000 milliLiter(s) (80 mL/Hr) IV Continuous <Continuous>    MEDICATIONS  (PRN):  acetaminophen     Tablet .. 650 milliGRAM(s) Oral every 6 hours PRN Temp greater or equal to 38C (100.4F), Mild Pain (1 - 3)  aluminum hydroxide/magnesium hydroxide/simethicone Suspension 30 milliLiter(s) Oral every 4 hours PRN Dyspepsia  melatonin 3 milliGRAM(s) Oral at bedtime PRN Insomnia  morphine  - Injectable 2 milliGRAM(s) IV Push every 4 hours PRN Moderate Pain (4 - 6)  ondansetron Injectable 4 milliGRAM(s) IV Push every 8 hours PRN Nausea and/or Vomiting

## 2024-03-19 NOTE — DIETITIAN INITIAL EVALUATION ADULT - PROBLEM SELECTOR PLAN 2
Patient with kidney cancer s/p right nephrectomy in [facility] in 2023  Patient is on immunotherapy, received a total of 3 doses, last dose 3 weeks ago, next dose scheduled for 3/19   - Heme Onc Dr. Montemayor consulted, appreciate recs

## 2024-03-19 NOTE — PROGRESS NOTE ADULT - PROBLEM SELECTOR PLAN 4
- Patient is not on any BP meds   - Continue to monitor vitals
- Patient is not on any BP meds   - Continue to monitor vitals

## 2024-03-19 NOTE — PROGRESS NOTE ADULT - PROBLEM SELECTOR PLAN 1
Patient presents with 8 day hx of diarrhea, poor PO intake, weakness, and fatigue   - S/P IV bolus x 3 and Ertapenem in Ed   - Lactate normalized    - VSS   - CT abdomen with no acute pathology   - Continue maintenance fluids at 80 cc / hr , change from NS to RL due to mild hypernatremia   - GI PCR/C.diff negative   - Will monitor off of abx for now  - F/u stool cultures   - Full liquid diet/advance as tolerated    - Morphine PRN for abdominal pain   - ID consulted Dr. Wilson   - Continue isolation
Patient presents with 8 day hx of diarrhea, poor PO intake, weakness, and fatigue   - S/P IV bolus x 3 and Ertapenem in Ed   - Lactate normalized    - VSS   - CT abdomen with no acute pathology   - Continue maintenance fluids at 80 cc / hr , change from NS to RL due to mild hypernatremia   - GI PCR/C.diff negative   - Will monitor off of abx for now  - F/u stool cultures  NTD   - diet advance as tolerated    - d/c morphin   - loperamide prn   - ID consulted Dr. Wilson

## 2024-03-19 NOTE — PROGRESS NOTE ADULT - PROBLEM SELECTOR PLAN 3
BUN/Cr= 11/1.4; baseline unclear   - PUNEET likely secondary to dehydration d/t diarrhea and poor PO intake   - Continue IV fluids  - renal function back to baseline
BUN/Cr= 11/1.4; baseline unclear   - PUNEET likely secondary to dehydration d/t diarrhea and poor PO intake   - Continue IV fluids  - renal function back to baseline

## 2024-03-19 NOTE — DIETITIAN INITIAL EVALUATION ADULT - OTHER INFO
Pt is a "63 M hx former smoker, cancer right kidney s/p nephrectomy (in [place]), cad s/p stents (on asa/plavix), bph, htn, hld c/o diarrhea for the past 8 days leading to feeling dehydrated along with abd discomfort."    Visited pt at bedside this am. Pt's spouse present during visit. Pt reports decreased appetite x ~9 days. Pt states that he consumed small amount of pudding and cereal this am. Continues on full liquid diet at this time. Receiving IVFs; LR@80ml/hr. No food allergies. No chewing/swallowing difficulties. Denies N/V. +BM 3/18; diarrhea ongoing. GI PCR/C.diff negative. CBW on admission 182#. Pt reports stable weight PTA. Believes that he may have lost some weight over the past week 2/2 poor po intake, diarrhea. Unable to quantify wt loss. 1+ BL foot/ankle edema noted. Bedscale wt of 180# obtained today. Skin: intact. Recommend advancing diet as medically feasible. Pt declines oral nutritional supplements at this time. Will honor pt's food preferences as able to optimize po intake/tolerance. RD remains available and will continue to follow-up.

## 2024-03-20 ENCOUNTER — TRANSCRIPTION ENCOUNTER (OUTPATIENT)
Age: 64
End: 2024-03-20

## 2024-03-20 VITALS
HEART RATE: 71 BPM | SYSTOLIC BLOOD PRESSURE: 126 MMHG | OXYGEN SATURATION: 96 % | TEMPERATURE: 98 F | RESPIRATION RATE: 18 BRPM | DIASTOLIC BLOOD PRESSURE: 67 MMHG

## 2024-03-20 LAB
ANION GAP SERPL CALC-SCNC: 3 MMOL/L — LOW (ref 5–17)
BASOPHILS # BLD AUTO: 0.05 K/UL — SIGNIFICANT CHANGE UP (ref 0–0.2)
BASOPHILS NFR BLD AUTO: 0.5 % — SIGNIFICANT CHANGE UP (ref 0–2)
BUN SERPL-MCNC: 7 MG/DL — SIGNIFICANT CHANGE UP (ref 7–23)
CALCIUM SERPL-MCNC: 7.7 MG/DL — LOW (ref 8.5–10.1)
CHLORIDE SERPL-SCNC: 117 MMOL/L — HIGH (ref 96–108)
CO2 SERPL-SCNC: 23 MMOL/L — SIGNIFICANT CHANGE UP (ref 22–31)
CREAT SERPL-MCNC: 1.3 MG/DL — SIGNIFICANT CHANGE UP (ref 0.5–1.3)
CULTURE RESULTS: SIGNIFICANT CHANGE UP
EGFR: 61 ML/MIN/1.73M2 — SIGNIFICANT CHANGE UP
EOSINOPHIL # BLD AUTO: 0.24 K/UL — SIGNIFICANT CHANGE UP (ref 0–0.5)
EOSINOPHIL NFR BLD AUTO: 2.5 % — SIGNIFICANT CHANGE UP (ref 0–6)
GLUCOSE SERPL-MCNC: 80 MG/DL — SIGNIFICANT CHANGE UP (ref 70–99)
HCT VFR BLD CALC: 35.5 % — LOW (ref 39–50)
HGB BLD-MCNC: 12 G/DL — LOW (ref 13–17)
IMM GRANULOCYTES NFR BLD AUTO: 3 % — HIGH (ref 0–0.9)
LYMPHOCYTES # BLD AUTO: 1.77 K/UL — SIGNIFICANT CHANGE UP (ref 1–3.3)
LYMPHOCYTES # BLD AUTO: 18.1 % — SIGNIFICANT CHANGE UP (ref 13–44)
MCHC RBC-ENTMCNC: 29.2 PG — SIGNIFICANT CHANGE UP (ref 27–34)
MCHC RBC-ENTMCNC: 33.8 GM/DL — SIGNIFICANT CHANGE UP (ref 32–36)
MCV RBC AUTO: 86.4 FL — SIGNIFICANT CHANGE UP (ref 80–100)
MONOCYTES # BLD AUTO: 1.03 K/UL — HIGH (ref 0–0.9)
MONOCYTES NFR BLD AUTO: 10.6 % — SIGNIFICANT CHANGE UP (ref 2–14)
NEUTROPHILS # BLD AUTO: 6.38 K/UL — SIGNIFICANT CHANGE UP (ref 1.8–7.4)
NEUTROPHILS NFR BLD AUTO: 65.3 % — SIGNIFICANT CHANGE UP (ref 43–77)
NRBC # BLD: 0 /100 WBCS — SIGNIFICANT CHANGE UP (ref 0–0)
PLATELET # BLD AUTO: 266 K/UL — SIGNIFICANT CHANGE UP (ref 150–400)
POTASSIUM SERPL-MCNC: 4.3 MMOL/L — SIGNIFICANT CHANGE UP (ref 3.5–5.3)
POTASSIUM SERPL-SCNC: 4.3 MMOL/L — SIGNIFICANT CHANGE UP (ref 3.5–5.3)
RBC # BLD: 4.11 M/UL — LOW (ref 4.2–5.8)
RBC # FLD: 12.9 % — SIGNIFICANT CHANGE UP (ref 10.3–14.5)
SODIUM SERPL-SCNC: 143 MMOL/L — SIGNIFICANT CHANGE UP (ref 135–145)
SPECIMEN SOURCE: SIGNIFICANT CHANGE UP
WBC # BLD: 9.76 K/UL — SIGNIFICANT CHANGE UP (ref 3.8–10.5)
WBC # FLD AUTO: 9.76 K/UL — SIGNIFICANT CHANGE UP (ref 3.8–10.5)

## 2024-03-20 PROCEDURE — 87045 FECES CULTURE AEROBIC BACT: CPT

## 2024-03-20 PROCEDURE — 99285 EMERGENCY DEPT VISIT HI MDM: CPT

## 2024-03-20 PROCEDURE — 87177 OVA AND PARASITES SMEARS: CPT

## 2024-03-20 PROCEDURE — 85025 COMPLETE CBC W/AUTO DIFF WBC: CPT

## 2024-03-20 PROCEDURE — 93005 ELECTROCARDIOGRAM TRACING: CPT

## 2024-03-20 PROCEDURE — 80053 COMPREHEN METABOLIC PANEL: CPT

## 2024-03-20 PROCEDURE — 36415 COLL VENOUS BLD VENIPUNCTURE: CPT

## 2024-03-20 PROCEDURE — 87046 STOOL CULTR AEROBIC BACT EA: CPT

## 2024-03-20 PROCEDURE — 71045 X-RAY EXAM CHEST 1 VIEW: CPT

## 2024-03-20 PROCEDURE — 80048 BASIC METABOLIC PNL TOTAL CA: CPT

## 2024-03-20 PROCEDURE — 86803 HEPATITIS C AB TEST: CPT

## 2024-03-20 PROCEDURE — G0378: CPT

## 2024-03-20 PROCEDURE — 87040 BLOOD CULTURE FOR BACTERIA: CPT

## 2024-03-20 PROCEDURE — 96374 THER/PROPH/DIAG INJ IV PUSH: CPT

## 2024-03-20 PROCEDURE — 83605 ASSAY OF LACTIC ACID: CPT

## 2024-03-20 PROCEDURE — 81003 URINALYSIS AUTO W/O SCOPE: CPT

## 2024-03-20 PROCEDURE — 87507 IADNA-DNA/RNA PROBE TQ 12-25: CPT

## 2024-03-20 PROCEDURE — 74176 CT ABD & PELVIS W/O CONTRAST: CPT | Mod: MC

## 2024-03-20 PROCEDURE — 85730 THROMBOPLASTIN TIME PARTIAL: CPT

## 2024-03-20 PROCEDURE — 84484 ASSAY OF TROPONIN QUANT: CPT

## 2024-03-20 PROCEDURE — 96375 TX/PRO/DX INJ NEW DRUG ADDON: CPT

## 2024-03-20 PROCEDURE — 87493 C DIFF AMPLIFIED PROBE: CPT

## 2024-03-20 PROCEDURE — 87086 URINE CULTURE/COLONY COUNT: CPT

## 2024-03-20 PROCEDURE — 85610 PROTHROMBIN TIME: CPT

## 2024-03-20 PROCEDURE — 85027 COMPLETE CBC AUTOMATED: CPT

## 2024-03-20 PROCEDURE — 99239 HOSP IP/OBS DSCHRG MGMT >30: CPT | Mod: GC

## 2024-03-20 PROCEDURE — 0225U NFCT DS DNA&RNA 21 SARSCOV2: CPT

## 2024-03-20 RX ORDER — FAMOTIDINE 10 MG/ML
20 INJECTION INTRAVENOUS DAILY
Refills: 0 | Status: DISCONTINUED | OUTPATIENT
Start: 2024-03-21 | End: 2024-03-20

## 2024-03-20 RX ADMIN — SODIUM CHLORIDE 80 MILLILITER(S): 9 INJECTION, SOLUTION INTRAVENOUS at 05:40

## 2024-03-20 RX ADMIN — Medication 81 MILLIGRAM(S): at 12:10

## 2024-03-20 RX ADMIN — CLOPIDOGREL BISULFATE 75 MILLIGRAM(S): 75 TABLET, FILM COATED ORAL at 12:11

## 2024-03-20 RX ADMIN — Medication 1 TABLET(S): at 08:08

## 2024-03-20 RX ADMIN — HEPARIN SODIUM 5000 UNIT(S): 5000 INJECTION INTRAVENOUS; SUBCUTANEOUS at 05:40

## 2024-03-20 NOTE — DISCHARGE NOTE NURSING/CASE MANAGEMENT/SOCIAL WORK - NSDCPEFALRISK_GEN_ALL_CORE
For information on Fall & Injury Prevention, visit: [email or web address] OR  [email or web address] OR  https://www.cdc.gov/steadi/patient.html

## 2024-03-20 NOTE — DISCHARGE NOTE NURSING/CASE MANAGEMENT/SOCIAL WORK - PATIENT PORTAL LINK FT
You can access the FollowMyHealth Patient Portal offered by [facility] by registering at the following website: [email or web address]. By joining [employer]’s FollowMyHealth portal, you will also be able to view your health information using other applications (apps) compatible with our system.

## 2024-03-22 LAB
CULTURE RESULTS: SIGNIFICANT CHANGE UP
CULTURE RESULTS: SIGNIFICANT CHANGE UP
SPECIMEN SOURCE: SIGNIFICANT CHANGE UP
SPECIMEN SOURCE: SIGNIFICANT CHANGE UP

## 2024-04-08 ENCOUNTER — OUTPATIENT (OUTPATIENT)
Dept: OUTPATIENT SERVICES | Facility: HOSPITAL | Age: 64
LOS: 1 days | End: 2024-04-08
Payer: MEDICARE

## 2024-04-08 ENCOUNTER — APPOINTMENT (OUTPATIENT)
Dept: MRI IMAGING | Facility: CLINIC | Age: 64
End: 2024-04-08
Payer: MEDICARE

## 2024-04-08 DIAGNOSIS — Z90.5 ACQUIRED ABSENCE OF KIDNEY: Chronic | ICD-10-CM

## 2024-04-08 DIAGNOSIS — Z98.890 OTHER SPECIFIED POSTPROCEDURAL STATES: Chronic | ICD-10-CM

## 2024-04-08 DIAGNOSIS — Z98.61 CORONARY ANGIOPLASTY STATUS: Chronic | ICD-10-CM

## 2024-04-08 DIAGNOSIS — R91.8 OTHER NONSPECIFIC ABNORMAL FINDING OF LUNG FIELD: ICD-10-CM

## 2024-04-08 DIAGNOSIS — C65.1 MALIGNANT NEOPLASM OF RIGHT RENAL PELVIS: ICD-10-CM

## 2024-04-08 PROCEDURE — 74181 MRI ABDOMEN W/O CONTRAST: CPT | Mod: 26

## 2024-04-08 PROCEDURE — 74181 MRI ABDOMEN W/O CONTRAST: CPT

## 2024-04-30 NOTE — H&P ADULT - NSHPOUTPATIENTPROVIDERS_GEN_ALL_CORE
RECALL: Last Colonoscopy on 11/14/2013 with Dr. Bowen Alegria. Recommend repeat  10 years.     PROCEDURE: Colonoscopy (35839)    KIDNEY CONCERNS: No Suprep - No recent labs to determine current kidney function    DIAGNOSIS: Colon Cancer Screening Z12.11    DIABETIC: no    PROCEDURE LOCATION: [facility]    SEDATION: IV Sedation Eligible    ANTIPLATELET / ANTICOAGULATION: MEDICATION:  None    MEDICATION HOLDS:  -N/A    SPECIAL INSTRUCTIONS:   Asthma-mild    Patient's chart reviewed, please contact to schedule.     ludwin brown ([facility])  nicholas onc Hon Montemayor  nephrologist in [facility]

## 2024-06-07 ENCOUNTER — APPOINTMENT (OUTPATIENT)
Dept: NUCLEAR MEDICINE | Facility: CLINIC | Age: 64
End: 2024-06-07
Payer: MEDICARE

## 2024-06-07 ENCOUNTER — OUTPATIENT (OUTPATIENT)
Dept: OUTPATIENT SERVICES | Facility: HOSPITAL | Age: 64
LOS: 1 days | End: 2024-06-07
Payer: MEDICARE

## 2024-06-07 DIAGNOSIS — R91.8 OTHER NONSPECIFIC ABNORMAL FINDING OF LUNG FIELD: ICD-10-CM

## 2024-06-07 DIAGNOSIS — C65.1 MALIGNANT NEOPLASM OF RIGHT RENAL PELVIS: ICD-10-CM

## 2024-06-07 DIAGNOSIS — Z90.5 ACQUIRED ABSENCE OF KIDNEY: Chronic | ICD-10-CM

## 2024-06-07 DIAGNOSIS — Z98.890 OTHER SPECIFIED POSTPROCEDURAL STATES: Chronic | ICD-10-CM

## 2024-06-07 DIAGNOSIS — Z98.61 CORONARY ANGIOPLASTY STATUS: Chronic | ICD-10-CM

## 2024-06-07 PROCEDURE — 78815 PET IMAGE W/CT SKULL-THIGH: CPT

## 2024-06-07 PROCEDURE — 78815 PET IMAGE W/CT SKULL-THIGH: CPT | Mod: 26,PS

## 2024-06-07 PROCEDURE — A9552: CPT

## 2024-06-19 ENCOUNTER — APPOINTMENT (OUTPATIENT)
Dept: CARDIOLOGY | Facility: CLINIC | Age: 64
End: 2024-06-19
Payer: MEDICARE

## 2024-06-19 ENCOUNTER — NON-APPOINTMENT (OUTPATIENT)
Age: 64
End: 2024-06-19

## 2024-06-19 VITALS
RESPIRATION RATE: 16 BRPM | BODY MASS INDEX: 25.34 KG/M2 | HEART RATE: 73 BPM | DIASTOLIC BLOOD PRESSURE: 79 MMHG | SYSTOLIC BLOOD PRESSURE: 121 MMHG | WEIGHT: 178 LBS | OXYGEN SATURATION: 100 %

## 2024-06-19 DIAGNOSIS — Z95.820 PERIPHERAL VASCULAR ANGIOPLASTY STATUS WITH IMPLANTS AND GRAFTS: ICD-10-CM

## 2024-06-19 DIAGNOSIS — Z87.448 PERSONAL HISTORY OF OTHER DISEASES OF URINARY SYSTEM: ICD-10-CM

## 2024-06-19 DIAGNOSIS — I25.10 ATHEROSCLEROTIC HEART DISEASE OF NATIVE CORONARY ARTERY W/OUT ANGINA PECTORIS: ICD-10-CM

## 2024-06-19 DIAGNOSIS — Z29.89 ENCOUNTER. FOR OTHER SPECIFIED PROPHYLACTIC MEASURES: ICD-10-CM

## 2024-06-19 PROCEDURE — 93306 TTE W/DOPPLER COMPLETE: CPT

## 2024-06-19 PROCEDURE — 99214 OFFICE O/P EST MOD 30 MIN: CPT

## 2024-06-19 PROCEDURE — 93000 ELECTROCARDIOGRAM COMPLETE: CPT

## 2024-06-19 PROCEDURE — G2211 COMPLEX E/M VISIT ADD ON: CPT

## 2024-06-19 RX ORDER — CLOPIDOGREL BISULFATE 75 MG/1
75 TABLET, FILM COATED ORAL DAILY
Qty: 90 | Refills: 1 | Status: ACTIVE | COMMUNITY
Start: 1900-01-01 | End: 1900-01-01

## 2024-06-19 NOTE — REASON FOR VISIT
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  He is on ASA 81, Plavix 75mg daily, and Crestor 5mg daily.

## 2024-06-19 NOTE — HISTORY OF PRESENT ILLNESS
[FreeTextEntry1] : He is s/p 8 cycles of immunotherapy with plans for 4 more cycles q 2 weeks. He presents today due to ?cardiomegaly seen on PET-CT. He has had side effects from immunotherapy including hypothyroidism and significant diarrhea requiring hospitalization. However, he has been stable from cardiac standpoint. He denies chest pain or SOB. He has no symptoms with exertion.  1/9/24 - He underwent EBUS biopsy which showed malignant cells (possible kidney origin). He is awaiting PET-CT. He was told by Dr. Montemayor (oncology) to come to cardiology to see if pt would be okay for immunotherapy. He reports rare episodes of chest discomfort lasting seconds after receiving diagnosis of cancer. He denies SOB. He is still walking and exercising.  12/5/23 - He needs clearance for EBUS biopsy for lung nodule. He reports feeling stable. Denies chest pain or SOB. He is still exercising/walking at least 1 hour every day without symptoms.  11/7/23 - Pt saw Dr. Eddy (nephrology) underwent labs which showed Cr 1.7 on 9/22/23. He reports feeling stable without chest pain or SOB. No orthopnea, PND, or LE edema. He is exercising/walking at least 1 hour every day without limitation.  9/12/23 - Pt was in [facility] for vacation and underwent CCTA for routine checkup which found CAD. He was ultimately admitted to hospital in [facility] 6/4-6/6/23 with SOB on exertion x 6 months and underwent LHC which found 100% LCx stenosis with R to L collaterals. Based on report, it appears he underwent DAVION (2.5 x 18mm [employer] stent) to dLCx c/b vessel dissection. He has residual mid/distal RCA stenosis (80%) which was not intervened on otherwise normal LM and normal LAD. He also had an echo in [facility] 4/17/23 showing normal LVEF 60% with no significant valvular disease. He also has b/l lung nodules seen on CT 4/17/23.  Since coming back to the [facility], pt has seen oncologist who recommended 3 month f/u for his lung nodules. His recent labs showed BUN/Cr 33/1.95 7/31/23 and LDL 38. He states that prior to PCI, he had significant SOB and occasional chest discomfort with exertion. He is now walking at least 1 km per day on a daily basis without chest pain or SOB. He has no orthopnea, PND, LE edema, dizziness, palpitations, or LOC.

## 2024-06-19 NOTE — ASSESSMENT
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  He is s/p 8 cycles of immunotherapy with plans for 4 more cycles q 2 weeks. He presents today due to ?cardiomegaly seen on PET-CT. He has had side effects from immunotherapy including hypothyroidism and significant diarrhea requiring hospitalization. However, he has been stable from cardiac standpoint. He denies chest pain or SOB. He has no symptoms with exertion.  1) CAD s/p stent 2) Metastatic RCC now on immunotherapy - LHC 6/5/23 with 100% LCx stenosis with R to L collaterals. Based on report, it appears he underwent DAVION (2.5 x 18mm [employer] stent) to dLCx c/b vessel dissection. He has residual mid/distal RCA stenosis (80%) which was not intervened on otherwise normal LM and normal LAD. - TTE 9/12/23 showed grossly normal LVEF 65-70%, normal RV size/function, and mild TR - PET-CT showed possible cardiomegaly. I advised pt to repeat TTE 6/19/24 which showed normal LVEF 60-65% and RV function with mild TR (appears relatively stable from previous) - He is s/p DAPT x 1 year post PCI. I advised pt to switch to Plavix 75mg monotherapy. - Continue Crestor 5mg daily, goal LDL 55 (last LDL was 38) - In view of CKD, lack of angina, good exercise tolerance after PCI, and metastatic RCC, continue med management for now. If pt becomes symptomatic, will consider nuclear stress to assess for ischemia vs. LHC to intervene on RCA.  3) CKD - F/U renal  4) Follow-up, 3 months or sooner if symptomatic

## 2024-07-05 ENCOUNTER — OUTPATIENT (OUTPATIENT)
Dept: OUTPATIENT SERVICES | Facility: HOSPITAL | Age: 64
LOS: 1 days | End: 2024-07-05
Payer: MEDICARE

## 2024-07-05 ENCOUNTER — APPOINTMENT (OUTPATIENT)
Dept: CT IMAGING | Facility: CLINIC | Age: 64
End: 2024-07-05
Payer: MEDICARE

## 2024-07-05 DIAGNOSIS — Z98.890 OTHER SPECIFIED POSTPROCEDURAL STATES: Chronic | ICD-10-CM

## 2024-07-05 DIAGNOSIS — R91.8 OTHER NONSPECIFIC ABNORMAL FINDING OF LUNG FIELD: ICD-10-CM

## 2024-07-05 DIAGNOSIS — R59.0 LOCALIZED ENLARGED LYMPH NODES: ICD-10-CM

## 2024-07-05 DIAGNOSIS — Z98.61 CORONARY ANGIOPLASTY STATUS: Chronic | ICD-10-CM

## 2024-07-05 DIAGNOSIS — C65.1 MALIGNANT NEOPLASM OF RIGHT RENAL PELVIS: ICD-10-CM

## 2024-07-05 DIAGNOSIS — Z90.5 ACQUIRED ABSENCE OF KIDNEY: Chronic | ICD-10-CM

## 2024-07-05 PROCEDURE — 71250 CT THORAX DX C-: CPT

## 2024-07-05 PROCEDURE — 71250 CT THORAX DX C-: CPT | Mod: 26

## 2024-08-12 ENCOUNTER — APPOINTMENT (OUTPATIENT)
Dept: NUCLEAR MEDICINE | Facility: CLINIC | Age: 64
End: 2024-08-12
Payer: MEDICARE

## 2024-08-12 PROCEDURE — 78815 PET IMAGE W/CT SKULL-THIGH: CPT | Mod: 26,PS

## 2024-09-05 ENCOUNTER — APPOINTMENT (OUTPATIENT)
Dept: ULTRASOUND IMAGING | Facility: CLINIC | Age: 64
End: 2024-09-05

## 2024-09-05 ENCOUNTER — OUTPATIENT (OUTPATIENT)
Dept: OUTPATIENT SERVICES | Facility: HOSPITAL | Age: 64
LOS: 1 days | End: 2024-09-05
Payer: COMMERCIAL

## 2024-09-05 DIAGNOSIS — Z98.61 CORONARY ANGIOPLASTY STATUS: Chronic | ICD-10-CM

## 2024-09-05 DIAGNOSIS — Z98.890 OTHER SPECIFIED POSTPROCEDURAL STATES: Chronic | ICD-10-CM

## 2024-09-05 DIAGNOSIS — Z90.5 ACQUIRED ABSENCE OF KIDNEY: Chronic | ICD-10-CM

## 2024-09-05 DIAGNOSIS — C65.1 MALIGNANT NEOPLASM OF RIGHT RENAL PELVIS: ICD-10-CM

## 2024-09-05 PROCEDURE — 76536 US EXAM OF HEAD AND NECK: CPT | Mod: 26

## 2024-09-05 PROCEDURE — 76536 US EXAM OF HEAD AND NECK: CPT

## 2024-09-17 ENCOUNTER — APPOINTMENT (OUTPATIENT)
Dept: CARDIOLOGY | Facility: CLINIC | Age: 64
End: 2024-09-17
Payer: MEDICARE

## 2024-09-17 ENCOUNTER — NON-APPOINTMENT (OUTPATIENT)
Age: 64
End: 2024-09-17

## 2024-09-17 VITALS
RESPIRATION RATE: 18 BRPM | DIASTOLIC BLOOD PRESSURE: 76 MMHG | SYSTOLIC BLOOD PRESSURE: 136 MMHG | HEART RATE: 69 BPM | OXYGEN SATURATION: 100 % | WEIGHT: 183 LBS | BODY MASS INDEX: 26.05 KG/M2

## 2024-09-17 DIAGNOSIS — I25.10 ATHEROSCLEROTIC HEART DISEASE OF NATIVE CORONARY ARTERY W/OUT ANGINA PECTORIS: ICD-10-CM

## 2024-09-17 DIAGNOSIS — Z95.820 PERIPHERAL VASCULAR ANGIOPLASTY STATUS WITH IMPLANTS AND GRAFTS: ICD-10-CM

## 2024-09-17 DIAGNOSIS — R00.2 PALPITATIONS: ICD-10-CM

## 2024-09-17 DIAGNOSIS — Z29.89 ENCOUNTER. FOR OTHER SPECIFIED PROPHYLACTIC MEASURES: ICD-10-CM

## 2024-09-17 PROCEDURE — 93000 ELECTROCARDIOGRAM COMPLETE: CPT

## 2024-09-17 PROCEDURE — G2211 COMPLEX E/M VISIT ADD ON: CPT

## 2024-09-17 PROCEDURE — 99214 OFFICE O/P EST MOD 30 MIN: CPT

## 2024-09-17 RX ORDER — PROPRANOLOL HYDROCHLORIDE 10 MG/1
10 TABLET ORAL
Qty: 30 | Refills: 1 | Status: ACTIVE | COMMUNITY
Start: 2024-09-17 | End: 1900-01-01

## 2024-09-17 NOTE — REASON FOR VISIT
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  He is on ASA 81, Plavix 75mg daily, and Crestor 5mg daily.

## 2024-09-17 NOTE — ASSESSMENT
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  1) Palpitations,  - Unclear arrhythmia lasting 3 hours on 9/8/24, possible SVT vs. ?AFIB. Currently asymptomatic. EKG showed sinus rhythm at 71 bpm. - Check 2 week event monitor - Try propranolol 10mg as needed - Reviewed vagal maneuvers with patient. However, we discussed if this recurs and persists again, especially with low blood pressure, I advised pt to proceed to ED immediately. - Pt is scheduled for immunotherapy again soon and will see if this happens again afterwards. Discussed to stay hydrated, drink Gatorade, replenish electrolytes if having loose stools post immunotherapy  2) CAD s/p stent 3) Metastatic RCC now on immunotherapy - LHC 6/5/23 with 100% LCx stenosis with R to L collaterals. Based on report, it appears he underwent DAVION (2.5 x 18mm [employer] stent) to dLCx c/b vessel dissection. He has residual mid/distal RCA stenosis (80%) which was not intervened on otherwise normal LM and normal LAD. - TTE 6/19/24 showed normal LVEF 60-65%, normal RV size/function, and mild TR  - He is s/p DAPT x 1 year post PCI. I advised pt to switch to Plavix 75mg monotherapy. - Continue Crestor 5mg daily, goal LDL 55 (last LDL was 38) - In view of CKD, lack of angina, good exercise tolerance after PCI, and metastatic RCC, continue med management for now. If pt becomes symptomatic, will consider nuclear stress to assess for ischemia vs. LHC to intervene on RCA.  4) CKD - F/U renal - Last Cr 1.52 9/2024  5) Follow-up 1 month, pending event monitor

## 2024-09-17 NOTE — HISTORY OF PRESENT ILLNESS
[FreeTextEntry1] : On 9/5/24, pt underwent immunotherapy as scheduled. He usually gets loose stools/diarrhea afterwards. On 9/8/24 when he woke up, he noticed palpitations, SOB, total body fatigue. His HR was 150s at the time. BP 90/50. He checked his watch, which states he had possible AFIB. He did not go to the hospital. He went to lay down and his wife gave him icepack for his head. After 3 hours, he states the palpitations slowly improved. He has not had recurrence since then. He currently denies CP, SOB, dizziness, LOC, orthopnea, PND, or LE edema.  6/19/24 - He is s/p 8 cycles of immunotherapy with plans for 4 more cycles q 2 weeks. He presents today due to ?cardiomegaly seen on PET-CT. He has had side effects from immunotherapy including hypothyroidism and significant diarrhea requiring hospitalization. However, he has been stable from cardiac standpoint. He denies chest pain or SOB. He has no symptoms with exertion.  1/9/24 - He underwent EBUS biopsy which showed malignant cells (possible kidney origin). He is awaiting PET-CT. He was told by Dr. Montemayor (oncology) to come to cardiology to see if pt would be okay for immunotherapy. He reports rare episodes of chest discomfort lasting seconds after receiving diagnosis of cancer. He denies SOB. He is still walking and exercising.  12/5/23 - He needs clearance for EBUS biopsy for lung nodule. He reports feeling stable. Denies chest pain or SOB. He is still exercising/walking at least 1 hour every day without symptoms.  11/7/23 - Pt saw Dr. Eddy (nephrology) underwent labs which showed Cr 1.7 on 9/22/23. He reports feeling stable without chest pain or SOB. No orthopnea, PND, or LE edema. He is exercising/walking at least 1 hour every day without limitation.  9/12/23 - Pt was in [facility] for vacation and underwent CCTA for routine checkup which found CAD. He was ultimately admitted to hospital in [facility] 6/4-6/6/23 with SOB on exertion x 6 months and underwent LHC which found 100% LCx stenosis with R to L collaterals. Based on report, it appears he underwent DAVION (2.5 x 18mm Eucatech stent) to dLCx c/b vessel dissection. He has residual mid/distal RCA stenosis (80%) which was not intervened on otherwise normal LM and normal LAD. He also had an echo in [facility] 4/17/23 showing normal LVEF 60% with no significant valvular disease. He also has b/l lung nodules seen on CT 4/17/23.  Since coming back to the [facility], pt has seen oncologist who recommended 3 month f/u for his lung nodules. His recent labs showed BUN/Cr 33/1.95 7/31/23 and LDL 38. He states that prior to PCI, he had significant SOB and occasional chest discomfort with exertion. He is now walking at least 1 km per day on a daily basis without chest pain or SOB. He has no orthopnea, PND, LE edema, dizziness, palpitations, or LOC.

## 2024-09-17 NOTE — ASSESSMENT
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  1) Palpitations,  - Unclear arrhythmia lasting 3 hours on 9/8/24, possible SVT vs. ?AFIB. Currently asymptomatic. EKG showed sinus rhythm at 71 bpm. - Check 2 week event monitor - Try propranolol 10mg as needed - Reviewed vagal maneuvers with patient. However, we discussed if this recurs and persists again, especially with low blood pressure, I advised pt to proceed to ED immediately. - Pt is scheduled for immunotherapy again soon and will see if this happens again afterwards. Discussed to stay hydrated, drink Gatorade, replenish electrolytes if having loose stools post immunotherapy  2) CAD s/p stent 3) Metastatic RCC now on immunotherapy - LHC 6/5/23 with 100% LCx stenosis with R to L collaterals. Based on report, it appears he underwent DAVION (2.5 x 18mm [employer]Spark stent) to dLCx c/b vessel dissection. He has residual mid/distal RCA stenosis (80%) which was not intervened on otherwise normal LM and normal LAD. - TTE 6/19/24 showed normal LVEF 60-65%, normal RV size/function, and mild TR  - He is s/p DAPT x 1 year post PCI. I advised pt to switch to Plavix 75mg monotherapy. - Continue Crestor 5mg daily, goal LDL 55 (last LDL was 38) - In view of CKD, lack of angina, good exercise tolerance after PCI, and metastatic RCC, continue med management for now. If pt becomes symptomatic, will consider nuclear stress to assess for ischemia vs. LHC to intervene on RCA.  4) CKD - F/U renal - Last Cr 1.52 9/2024  5) Follow-up 1 month, pending event monitor

## 2024-09-17 NOTE — HISTORY OF PRESENT ILLNESS
[FreeTextEntry1] : On 9/5/24, pt underwent immunotherapy as scheduled. He usually gets loose stools/diarrhea afterwards. On 9/8/24 when he woke up, he noticed palpitations, SOB, total body fatigue. His HR was 150s at the time. BP 90/50. He checked his watch, which states he had possible AFIB. He did not go to the hospital. He went to lay down and his wife gave him icepack for his head. After 3 hours, he states the palpitations slowly improved. He has not had recurrence since then. He currently denies CP, SOB, dizziness, LOC, orthopnea, PND, or LE edema.  6/19/24 - He is s/p 8 cycles of immunotherapy with plans for 4 more cycles q 2 weeks. He presents today due to ?cardiomegaly seen on PET-CT. He has had side effects from immunotherapy including hypothyroidism and significant diarrhea requiring hospitalization. However, he has been stable from cardiac standpoint. He denies chest pain or SOB. He has no symptoms with exertion.  1/9/24 - He underwent EBUS biopsy which showed malignant cells (possible kidney origin). He is awaiting PET-CT. He was told by Dr. Montemayor (oncology) to come to cardiology to see if pt would be okay for immunotherapy. He reports rare episodes of chest discomfort lasting seconds after receiving diagnosis of cancer. He denies SOB. He is still walking and exercising.  12/5/23 - He needs clearance for EBUS biopsy for lung nodule. He reports feeling stable. Denies chest pain or SOB. He is still exercising/walking at least 1 hour every day without symptoms.  11/7/23 - Pt saw Dr. Eddy (nephrology) underwent labs which showed Cr 1.7 on 9/22/23. He reports feeling stable without chest pain or SOB. No orthopnea, PND, or LE edema. He is exercising/walking at least 1 hour every day without limitation.  9/12/23 - Pt was in [facility] for vacation and underwent CCTA for routine checkup which found CAD. He was ultimately admitted to hospital in [facility] 6/4-6/6/23 with SOB on exertion x 6 months and underwent LHC which found 100% LCx stenosis with R to L collaterals. Based on report, it appears he underwent DAVION (2.5 x 18mm Eucatech stent) to dLCx c/b vessel dissection. He has residual mid/distal RCA stenosis (80%) which was not intervened on otherwise normal LM and normal LAD. He also had an echo in [facility] 4/17/23 showing normal LVEF 60% with no significant valvular disease. He also has b/l lung nodules seen on CT 4/17/23.  Since coming back to the [facility], pt has seen oncologist who recommended 3 month f/u for his lung nodules. His recent labs showed BUN/Cr 33/1.95 7/31/23 and LDL 38. He states that prior to PCI, he had significant SOB and occasional chest discomfort with exertion. He is now walking at least 1 km per day on a daily basis without chest pain or SOB. He has no orthopnea, PND, LE edema, dizziness, palpitations, or LOC.

## 2024-10-25 ENCOUNTER — APPOINTMENT (OUTPATIENT)
Dept: NUCLEAR MEDICINE | Facility: CLINIC | Age: 64
End: 2024-10-25
Payer: MEDICARE

## 2024-10-25 ENCOUNTER — OUTPATIENT (OUTPATIENT)
Dept: OUTPATIENT SERVICES | Facility: HOSPITAL | Age: 64
LOS: 1 days | End: 2024-10-25
Payer: COMMERCIAL

## 2024-10-25 DIAGNOSIS — Z98.890 OTHER SPECIFIED POSTPROCEDURAL STATES: Chronic | ICD-10-CM

## 2024-10-25 DIAGNOSIS — R91.8 OTHER NONSPECIFIC ABNORMAL FINDING OF LUNG FIELD: ICD-10-CM

## 2024-10-25 DIAGNOSIS — Z90.5 ACQUIRED ABSENCE OF KIDNEY: Chronic | ICD-10-CM

## 2024-10-25 DIAGNOSIS — Z98.61 CORONARY ANGIOPLASTY STATUS: Chronic | ICD-10-CM

## 2024-10-25 DIAGNOSIS — C65.1 MALIGNANT NEOPLASM OF RIGHT RENAL PELVIS: ICD-10-CM

## 2024-10-25 PROCEDURE — 78815 PET IMAGE W/CT SKULL-THIGH: CPT | Mod: 26,PS

## 2024-10-25 PROCEDURE — 78815 PET IMAGE W/CT SKULL-THIGH: CPT

## 2024-10-25 PROCEDURE — A9552: CPT

## 2024-12-18 ENCOUNTER — APPOINTMENT (OUTPATIENT)
Dept: CARDIOLOGY | Facility: CLINIC | Age: 64
End: 2024-12-18
Payer: MEDICARE

## 2024-12-18 ENCOUNTER — NON-APPOINTMENT (OUTPATIENT)
Age: 64
End: 2024-12-18

## 2024-12-18 VITALS
SYSTOLIC BLOOD PRESSURE: 95 MMHG | HEART RATE: 84 BPM | RESPIRATION RATE: 16 BRPM | OXYGEN SATURATION: 99 % | WEIGHT: 185 LBS | DIASTOLIC BLOOD PRESSURE: 59 MMHG | BODY MASS INDEX: 26.34 KG/M2

## 2024-12-18 DIAGNOSIS — Z29.89 ENCOUNTER. FOR OTHER SPECIFIED PROPHYLACTIC MEASURES: ICD-10-CM

## 2024-12-18 DIAGNOSIS — Z87.448 PERSONAL HISTORY OF OTHER DISEASES OF URINARY SYSTEM: ICD-10-CM

## 2024-12-18 DIAGNOSIS — Z95.820 PERIPHERAL VASCULAR ANGIOPLASTY STATUS WITH IMPLANTS AND GRAFTS: ICD-10-CM

## 2024-12-18 DIAGNOSIS — I25.10 ATHEROSCLEROTIC HEART DISEASE OF NATIVE CORONARY ARTERY W/OUT ANGINA PECTORIS: ICD-10-CM

## 2024-12-18 DIAGNOSIS — R42 DIZZINESS AND GIDDINESS: ICD-10-CM

## 2024-12-18 DIAGNOSIS — R00.2 PALPITATIONS: ICD-10-CM

## 2024-12-18 PROCEDURE — G2211 COMPLEX E/M VISIT ADD ON: CPT

## 2024-12-18 PROCEDURE — 93000 ELECTROCARDIOGRAM COMPLETE: CPT

## 2024-12-18 PROCEDURE — 99214 OFFICE O/P EST MOD 30 MIN: CPT

## 2025-01-08 ENCOUNTER — INPATIENT (INPATIENT)
Facility: HOSPITAL | Age: 65
LOS: 0 days | Discharge: HOME | DRG: 125 | End: 2025-01-08
Attending: OPHTHALMOLOGY | Admitting: OPHTHALMOLOGY
Payer: MEDICARE

## 2025-01-08 ENCOUNTER — TRANSCRIPTION ENCOUNTER (OUTPATIENT)
Age: 65
End: 2025-01-08

## 2025-01-08 VITALS
TEMPERATURE: 98 F | HEART RATE: 71 BPM | OXYGEN SATURATION: 99 % | DIASTOLIC BLOOD PRESSURE: 61 MMHG | SYSTOLIC BLOOD PRESSURE: 120 MMHG | HEIGHT: 72 IN | WEIGHT: 182.98 LBS | RESPIRATION RATE: 14 BRPM

## 2025-01-08 VITALS
DIASTOLIC BLOOD PRESSURE: 61 MMHG | RESPIRATION RATE: 16 BRPM | HEART RATE: 81 BPM | OXYGEN SATURATION: 98 % | SYSTOLIC BLOOD PRESSURE: 130 MMHG

## 2025-01-08 DIAGNOSIS — H33.20 SEROUS RETINAL DETACHMENT, UNSPECIFIED EYE: ICD-10-CM

## 2025-01-08 DIAGNOSIS — Z90.5 ACQUIRED ABSENCE OF KIDNEY: Chronic | ICD-10-CM

## 2025-01-08 DIAGNOSIS — Z98.890 OTHER SPECIFIED POSTPROCEDURAL STATES: Chronic | ICD-10-CM

## 2025-01-08 DIAGNOSIS — Z95.5 PRESENCE OF CORONARY ANGIOPLASTY IMPLANT AND GRAFT: Chronic | ICD-10-CM

## 2025-01-08 DIAGNOSIS — Z95.828 PRESENCE OF OTHER VASCULAR IMPLANTS AND GRAFTS: Chronic | ICD-10-CM

## 2025-01-08 DIAGNOSIS — Z98.61 CORONARY ANGIOPLASTY STATUS: Chronic | ICD-10-CM

## 2025-01-08 LAB
ACETONE SERPL-MCNC: NEGATIVE — SIGNIFICANT CHANGE UP
ALBUMIN SERPL ELPH-MCNC: 4 G/DL — SIGNIFICANT CHANGE UP (ref 3.3–5)
ALP SERPL-CCNC: 52 U/L — SIGNIFICANT CHANGE UP (ref 30–120)
ALT FLD-CCNC: 20 U/L — SIGNIFICANT CHANGE UP (ref 10–60)
ANION GAP SERPL CALC-SCNC: 7 MMOL/L — SIGNIFICANT CHANGE UP (ref 5–17)
AST SERPL-CCNC: 17 U/L — SIGNIFICANT CHANGE UP (ref 10–40)
BASE EXCESS BLDV CALC-SCNC: 6.2 MMOL/L — HIGH (ref -2–3)
BASOPHILS # BLD AUTO: 0.11 K/UL — SIGNIFICANT CHANGE UP (ref 0–0.2)
BASOPHILS NFR BLD AUTO: 2 % — SIGNIFICANT CHANGE UP (ref 0–2)
BILIRUB SERPL-MCNC: 0.8 MG/DL — SIGNIFICANT CHANGE UP (ref 0.2–1.2)
BUN SERPL-MCNC: 32 MG/DL — HIGH (ref 7–23)
CALCIUM SERPL-MCNC: 9.2 MG/DL — SIGNIFICANT CHANGE UP (ref 8.4–10.5)
CHLORIDE SERPL-SCNC: 100 MMOL/L — SIGNIFICANT CHANGE UP (ref 96–108)
CO2 SERPL-SCNC: 29 MMOL/L — SIGNIFICANT CHANGE UP (ref 22–31)
CREAT SERPL-MCNC: 1.85 MG/DL — HIGH (ref 0.5–1.3)
EGFR: 40 ML/MIN/1.73M2 — LOW
EOSINOPHIL # BLD AUTO: 1.03 K/UL — HIGH (ref 0–0.5)
EOSINOPHIL NFR BLD AUTO: 18.4 % — HIGH (ref 0–6)
GLUCOSE SERPL-MCNC: 93 MG/DL — SIGNIFICANT CHANGE UP (ref 70–99)
HCO3 BLDV-SCNC: 32 MMOL/L — HIGH (ref 22–29)
HCT VFR BLD CALC: 42.7 % — SIGNIFICANT CHANGE UP (ref 39–50)
HGB BLD-MCNC: 14.3 G/DL — SIGNIFICANT CHANGE UP (ref 13–17)
IMM GRANULOCYTES NFR BLD AUTO: 0.4 % — SIGNIFICANT CHANGE UP (ref 0–0.9)
LYMPHOCYTES # BLD AUTO: 1.46 K/UL — SIGNIFICANT CHANGE UP (ref 1–3.3)
LYMPHOCYTES # BLD AUTO: 26.1 % — SIGNIFICANT CHANGE UP (ref 13–44)
MCHC RBC-ENTMCNC: 30.2 PG — SIGNIFICANT CHANGE UP (ref 27–34)
MCHC RBC-ENTMCNC: 33.5 G/DL — SIGNIFICANT CHANGE UP (ref 32–36)
MCV RBC AUTO: 90.3 FL — SIGNIFICANT CHANGE UP (ref 80–100)
MONOCYTES # BLD AUTO: 0.33 K/UL — SIGNIFICANT CHANGE UP (ref 0–0.9)
MONOCYTES NFR BLD AUTO: 5.9 % — SIGNIFICANT CHANGE UP (ref 2–14)
NEUTROPHILS # BLD AUTO: 2.64 K/UL — SIGNIFICANT CHANGE UP (ref 1.8–7.4)
NEUTROPHILS NFR BLD AUTO: 47.2 % — SIGNIFICANT CHANGE UP (ref 43–77)
NRBC # BLD: 0 /100 WBCS — SIGNIFICANT CHANGE UP (ref 0–0)
PCO2 BLDV: 56 MMHG — HIGH (ref 42–55)
PH BLDV: 7.36 — SIGNIFICANT CHANGE UP (ref 7.32–7.43)
PLATELET # BLD AUTO: 291 K/UL — SIGNIFICANT CHANGE UP (ref 150–400)
PO2 BLDV: <35 MMHG — SIGNIFICANT CHANGE UP (ref 25–45)
POTASSIUM SERPL-MCNC: 4.7 MMOL/L — SIGNIFICANT CHANGE UP (ref 3.5–5.3)
POTASSIUM SERPL-SCNC: 4.7 MMOL/L — SIGNIFICANT CHANGE UP (ref 3.5–5.3)
PROT SERPL-MCNC: 8.1 G/DL — SIGNIFICANT CHANGE UP (ref 6–8.3)
RBC # BLD: 4.73 M/UL — SIGNIFICANT CHANGE UP (ref 4.2–5.8)
RBC # FLD: 12.5 % — SIGNIFICANT CHANGE UP (ref 10.3–14.5)
SAO2 % BLDV: 29.4 % — LOW (ref 67–88)
SODIUM SERPL-SCNC: 136 MMOL/L — SIGNIFICANT CHANGE UP (ref 135–145)
WBC # BLD: 5.59 K/UL — SIGNIFICANT CHANGE UP (ref 3.8–10.5)
WBC # FLD AUTO: 5.59 K/UL — SIGNIFICANT CHANGE UP (ref 3.8–10.5)

## 2025-01-08 PROCEDURE — 99285 EMERGENCY DEPT VISIT HI MDM: CPT

## 2025-01-08 PROCEDURE — C1889: CPT

## 2025-01-08 PROCEDURE — 99221 1ST HOSP IP/OBS SF/LOW 40: CPT

## 2025-01-08 PROCEDURE — 85025 COMPLETE CBC W/AUTO DIFF WBC: CPT

## 2025-01-08 PROCEDURE — 82009 KETONE BODYS QUAL: CPT

## 2025-01-08 PROCEDURE — 80053 COMPREHEN METABOLIC PANEL: CPT

## 2025-01-08 PROCEDURE — 93005 ELECTROCARDIOGRAM TRACING: CPT

## 2025-01-08 PROCEDURE — 67108 REPAIR DETACHED RETINA: CPT | Mod: AS,LT

## 2025-01-08 PROCEDURE — 82803 BLOOD GASES ANY COMBINATION: CPT

## 2025-01-08 PROCEDURE — 36415 COLL VENOUS BLD VENIPUNCTURE: CPT

## 2025-01-08 PROCEDURE — 67108 REPAIR DETACHED RETINA: CPT

## 2025-01-08 PROCEDURE — 93010 ELECTROCARDIOGRAM REPORT: CPT

## 2025-01-08 DEVICE — LASER PROBE 25G CONSTELLATION: Type: IMPLANTABLE DEVICE | Site: LEFT | Status: FUNCTIONAL

## 2025-01-08 DEVICE — GS C3F8 PERFLUOROPROPANE IOL 2.5 L 20GM: Type: IMPLANTABLE DEVICE | Site: LEFT | Status: FUNCTIONAL

## 2025-01-08 RX ORDER — LOSARTAN POTASSIUM 100 MG/1
1 TABLET, FILM COATED ORAL
Refills: 0 | DISCHARGE

## 2025-01-08 RX ORDER — DAPAGLIFLOZIN 5 MG/1
1 TABLET, FILM COATED ORAL
Refills: 0 | DISCHARGE

## 2025-01-08 NOTE — ASU DISCHARGE PLAN (ADULT/PEDIATRIC) - PROVIDER TOKENS
FREE:[LAST:[Joann],FIRST:[Andrés],PHONE:[phone]],FAX:[(   )    -],ADDRESS:[[address] [place] [address]],SCHEDULEDAPPT:[01/09/2025],SCHEDULEDAPPTTIME:[12:30 PM]]

## 2025-01-08 NOTE — ASU PATIENT PROFILE, ADULT - REASON FOR ADMISSION, PROFILE
Airway    Performed by: Steph Donnelly CRNA  Authorized by: Steph Donnelly CRNA    Final Airway Type:  Endotracheal airway  Final Endotracheal Airway*:  ETT  ETT Size (mm)*:  7.5  Cuff*:  Regular  Technique Used for Successful ETT Placement:  Direct laryngoscopy  Intubation Procedure*:  ETCO2, Preoxygenation, Cricoid Pressure, Atraumatic and Dentition Unchanged  Insertion Site:  Oral  Blade Type*:  MAC  Blade Size*:  4  Measured from*:  Lips  Secured at (cm)*:  21  Placement Verified by: auscultation    Glottic View*:  2 - partial view of glottis  Attempts*:  1  Number of Other Approaches Attempted:  0   Patient Identified, Procedure confirmed, Emergency equipment available and Safety protocols followed  Location:  OR  Urgency:  Elective  Difficult Airway: No    Indications for Airway Management:  Anesthesia  Spontaneous Ventilation: present    Sedation Level:  Anesthetized  MILS Maintained Throughout: No    Mask Difficulty Assessment:  1 - vent by mask  Start Time: 7/29/2024 9:12 AM       Left eye blurry vision with dark spot

## 2025-01-08 NOTE — ED PROVIDER NOTE - DIFFERENTIAL DIAGNOSIS
Patient presenting to the emergency room with a chief complaint of a left retinal detachment.  Will obtain screening preop and admit Differential Diagnosis

## 2025-01-08 NOTE — ED ADULT NURSE NOTE - NS TRANSFER PATIENT BELONGINGS
ear pods/ jacket/Wrist Watch/Cell Phone/PDA (specify)/Electronic Device (specify)/Other belongings/Clothing

## 2025-01-08 NOTE — ASU PATIENT PROFILE, ADULT - NSICDXPASTSURGICALHX_GEN_ALL_CORE_FT
PAST SURGICAL HISTORY:  H/O right nephrectomy     History of cardiac cath     Port-A-Cath in place     Stented coronary artery x1

## 2025-01-08 NOTE — ED PROVIDER NOTE - CLINICAL SUMMARY MEDICAL DECISION MAKING FREE TEXT BOX
Patient is a 64-year-old male who presents to the emergency room with a chief complaint of left retinal detachment.  Past medical history of CAD status post stents BPH hypertension hyperlipidemia history of a right kidney nephrectomy history of lung CA on immunotherapy.  Patient reports that he has noted visual changes out of his left eye for the last 2 weeks.  He reports little to no vision left eye is only seeing a small [place].  Followed up with ophthalmology was diagnosed with a left retinal detachment and presents today for operative intervention.  Denies any acute trauma to the eye.  Denies headache nausea vomiting chest pain shortness of breath or abdominal pain.  Last p.o. intake was at 10 PM. Patient presenting to the emergency room with a chief complaint of a left retinal detachment.  Will obtain screening preop and admit

## 2025-01-08 NOTE — CONSULT NOTE ADULT - ASSESSMENT
65 yo male, pmh of CAD s/p stent, former smoker, kidney cancer s/p nephrectomy in remission, CKD stage 3, lung ca on immunotherapy - last treatment 2 weeks ago, presenting for vision changes, admitted for left eye serous retinal detachment    left retinal detachment   - no medical contraindication to necessary procedure, RCRI class 2 risk for low risk procedure  - further management as per ophthalmology, likely dc home afterward.     CKD stage 3 - creatinine near baseline  - on farxiga for kidney protection, no signs of ketoacidosis based on lab work  - can continue losartan, farxiga on DC    cad - continue aspirin, plavix, statin    lung ca- on immunotherapy, following up with oncology tomorrow

## 2025-01-08 NOTE — ASU PATIENT PROFILE, ADULT - NSICDXPASTMEDICALHX_GEN_ALL_CORE_FT
PAST MEDICAL HISTORY:  CAD (coronary artery disease)     Cancer of right kidney     Former smoker     H/O sleep apnea     History of BPH     HLD (hyperlipidemia)     HTN (hypertension)     Lung cancer     Lung disorder

## 2025-01-08 NOTE — ASU DISCHARGE PLAN (ADULT/PEDIATRIC) - ASU DC SPECIAL INSTRUCTIONSFT
Please maintain FACE DOWN position as much as possible with short breaks for bathroom and meals.  If you are unable to sleep in face down position, you may sleep on your LEFT side with your LEFT ear on the pillow.  Please keep eye shield on until seen in the office.   You may resume your medication regimen as usual.   Please follow up with your retina surgeon's office for your appointment tomorrow.

## 2025-01-08 NOTE — ED ADULT NURSE NOTE - NSICDXPASTSURGICALHX_GEN_ALL_CORE_FT
PAST SURGICAL HISTORY:  H/O right nephrectomy     History of cardiac cath     History of percutaneous coronary intervention      PAST SURGICAL HISTORY:  H/O right nephrectomy     History of cardiac cath     History of percutaneous coronary intervention     Port-A-Cath in place     Stented coronary artery

## 2025-01-08 NOTE — ASU DISCHARGE PLAN (ADULT/PEDIATRIC) - CARE PROVIDER_API CALL
Andrés David  [address] [place] [address] [address] [place], [place] [id number]  Phone: [phone]  Fax: (   )    -  Scheduled Appointment: 01/09/2025 12:30 PM

## 2025-01-08 NOTE — ASU DISCHARGE PLAN (ADULT/PEDIATRIC) - FINANCIAL ASSISTANCE
[facility] provides services at a reduced cost to those who are determined to be eligible through [facility]’s financial assistance program. Information regarding [facility]’s financial assistance program can be found by going to [email or web address] or by calling [phone].

## 2025-01-08 NOTE — ED PROVIDER NOTE - OBJECTIVE STATEMENT
Patient is a 64-year-old male who presents to the emergency room with a chief complaint of left retinal detachment.  Past medical history of CAD status post stents BPH hypertension hyperlipidemia history of a right kidney nephrectomy history of lung CA on immunotherapy.  Patient reports that he has noted visual changes out of his left eye for the last 2 weeks.  He reports little to no vision left eye is only seeing a small [place].  Followed up with ophthalmology was diagnosed with a left retinal detachment and presents today for operative intervention.  Denies any acute trauma to the eye.  Denies headache nausea vomiting chest pain shortness of breath or abdominal pain.  Last p.o. intake was at 10 PM.

## 2025-01-08 NOTE — ED ADULT NURSE NOTE - NSICDXPASTMEDICALHX_GEN_ALL_CORE_FT
PAST MEDICAL HISTORY:  CAD (coronary artery disease)     Cancer of right kidney     Former smoker     H/O sleep apnea     History of BPH     History of nephrectomy right    HLD (hyperlipidemia)     HTN (hypertension)     Lung disorder      PAST MEDICAL HISTORY:  CAD (coronary artery disease)     Cancer of right kidney     Former smoker     H/O sleep apnea     History of BPH     History of nephrectomy right    HLD (hyperlipidemia)     HTN (hypertension)     Lung cancer     Lung disorder

## 2025-01-08 NOTE — ASU PATIENT PROFILE, ADULT - FALL HARM RISK - HARM RISK INTERVENTIONS

## 2025-01-08 NOTE — ED ADULT NURSE NOTE - OBJECTIVE STATEMENT
pt arrives to ED for retinal detachment surgery of left eye. reports loss of vision to mid upper visual field that started 2 weeks ago.   pt last ate/drank 11pm last night.   hx kidney ca/ lung ca, currently on chemo therapy, last infusion 2 weeks ago, due tomorrow. right chest wall port present, not accessed.

## 2025-01-08 NOTE — CONSULT NOTE ADULT - SUBJECTIVE AND OBJECTIVE BOX
HPI:  65 yo male, pmh of CAD s/p stent, former smoker, kidney cancer s/p nephrectomy in remission, CKD stage 3, lung ca on immunotherapy - last treatment 2 weeks ago, presenting for vision changes starting 2 weeks ago in left eye, started as floaters became area of blurred vision can't see through in 10-12 o'clock area. No known trauma, denies chest pain, dizziness, fever, chills, nausea, vomiting. has chronic dyspnea with exertion due to lung ca and heart disease, no recent worsening, able to walk up 1 flight of stairs without stopping. No prior issues with anesthesia. no known family history of complications with anesthesia.    PAST MEDICAL & SURGICAL HISTORY:  Lung disorder      HTN (hypertension)      CAD (coronary artery disease)      Former smoker      HLD (hyperlipidemia)      History of BPH      Cancer of right kidney      History of nephrectomy  right      H/O sleep apnea      Lung cancer      History of cardiac cath    CKD stage 3 - baseline Cr 1.6    H/O right nephrectomy      History of percutaneous coronary intervention      Stented coronary artery      Port-A-Cath in place        Home Medications:  aspirin 81 mg oral tablet: 1 tab(s) orally once a day (in the morning) (08 Jan 2025 08:57)  Chemo therapy Q2 weeks:  (08 Jan 2025 09:01)  clopidogrel 75 mg oral tablet: 1 tab(s) orally once a day (in the morning) (08 Jan 2025 08:57)  Farxiga 5 mg oral tablet: 1 tab(s) orally once a day (08 Jan 2025 09:01)  losartan 25 mg oral tablet: 1 tab(s) orally once a day (08 Jan 2025 09:01)  rosuvastatin 5 mg oral tablet: 1 tab(s) orally once a day (at bedtime) (08 Jan 2025 08:57)        Allergies    penicillin (Rash)    Intolerances        SOCIAL HISTORY:  former 1 ppd smoker x 50 years, quit 3 years ago, former social etoh user, quit 3 years ago    FAMILY HISTORY:  FH: HTN (hypertension) (Mother)    FH: type 2 diabetes (Father)        Vital Signs Last 24 Hrs  T(C): 36.7 (08 Jan 2025 08:26), Max: 36.7 (08 Jan 2025 08:26)  T(F): 98 (08 Jan 2025 08:26), Max: 98 (08 Jan 2025 08:26)  HR: 71 (08 Jan 2025 08:26) (71 - 71)  BP: 120/61 (08 Jan 2025 08:26) (120/61 - 120/61)  BP(mean): --  RR: 14 (08 Jan 2025 08:26) (14 - 14)  SpO2: 99% (08 Jan 2025 08:26) (99% - 99%)          I&O's Detail    Daily Height in cm: 182.88 (08 Jan 2025 08:26)    Daily     REVIEW OF SYSTEMS:  as per hpi, other systems reviewed and are negative    PHYSICAL EXAM:    GENERAL: NAD, well-groomed, older male  HEAD:  Atraumatic, Normocephalic  EYES: EOMI, PERRLA, conjunctiva and sclera clear  ENMT: No tonsillar erythema, exudates, or enlargement; Moist mucous membranes, No lesions  NECK: Supple, No JVD, Normal thyroid  NERVOUS SYSTEM:  Alert & Oriented X3, Good concentration; Motor Strength 5/5 B/L upper and lower extremities  CHEST/LUNG: Clear to auscultation bilaterally; No rales, rhonchi, wheezing, or rubs  HEART: Regular rate and rhythm; No murmurs, rubs, or gallops  ABDOMEN: Soft, Nontender, Nondistended; Bowel sounds present  EXTREMITIES:  2+ Peripheral Pulses, No clubbing, cyanosis, or edema  LYMPH: No lymphadenopathy   SKIN: No rashes or lesions      LABS:                        14.3   5.59  )-----------( 291      ( 08 Jan 2025 08:52 )             42.7     01-08    136  |  100  |  32[H]  ----------------------------<  93  4.7   |  29  |  1.85[H]    Ca    9.2      08 Jan 2025 08:52    TPro  8.1  /  Alb  4.0  /  TBili  0.8  /  DBili  x   /  AST  17  /  ALT  20  /  AlkPhos  52  01-08      Urinalysis Basic - ( 08 Jan 2025 08:52 )    Color: x / Appearance: x / SG: x / pH: x  Gluc: 93 mg/dL / Ketone: x  / Bili: x / Urobili: x   Blood: x / Protein: x / Nitrite: x   Leuk Esterase: x / RBC: x / WBC x   Sq Epi: x / Non Sq Epi: x / Bacteria: x      Blood Gas Profile - Venous (01.08.25 @ 08:52)   pH, Venous: 7.36  pCO2, Venous: 56 mmHg  pO2, Venous: <35 mmHg  HCO3, Venous: 32 mmol/L  Base Excess, Venous: 6.2 mmol/L  Oxygen Saturation, Venous: 29.4 %    EKG: normal sinus rhythm        RADIOLOGY & ADDITIONAL STUDIES:

## 2025-01-09 PROBLEM — Z90.5 ACQUIRED ABSENCE OF KIDNEY: Chronic | Status: INACTIVE | Noted: 2024-01-18 | Resolved: 2025-01-08

## 2025-01-22 ENCOUNTER — APPOINTMENT (OUTPATIENT)
Dept: NUCLEAR MEDICINE | Facility: CLINIC | Age: 65
End: 2025-01-22
Payer: MEDICARE

## 2025-01-22 ENCOUNTER — OUTPATIENT (OUTPATIENT)
Dept: OUTPATIENT SERVICES | Facility: HOSPITAL | Age: 65
LOS: 1 days | End: 2025-01-22
Payer: MEDICARE

## 2025-01-22 DIAGNOSIS — C78.02 SECONDARY MALIGNANT NEOPLASM OF LEFT LUNG: ICD-10-CM

## 2025-01-22 DIAGNOSIS — C79.01 SECONDARY MALIGNANT NEOPLASM OF RIGHT KIDNEY AND RENAL PELVIS: ICD-10-CM

## 2025-01-22 DIAGNOSIS — Z95.5 PRESENCE OF CORONARY ANGIOPLASTY IMPLANT AND GRAFT: Chronic | ICD-10-CM

## 2025-01-22 DIAGNOSIS — Z95.828 PRESENCE OF OTHER VASCULAR IMPLANTS AND GRAFTS: Chronic | ICD-10-CM

## 2025-01-22 DIAGNOSIS — Z90.5 ACQUIRED ABSENCE OF KIDNEY: Chronic | ICD-10-CM

## 2025-01-22 DIAGNOSIS — C65.1 MALIGNANT NEOPLASM OF RIGHT RENAL PELVIS: ICD-10-CM

## 2025-01-22 DIAGNOSIS — R59.0 LOCALIZED ENLARGED LYMPH NODES: ICD-10-CM

## 2025-01-22 DIAGNOSIS — R91.8 OTHER NONSPECIFIC ABNORMAL FINDING OF LUNG FIELD: ICD-10-CM

## 2025-01-22 DIAGNOSIS — Z98.890 OTHER SPECIFIED POSTPROCEDURAL STATES: Chronic | ICD-10-CM

## 2025-01-22 PROBLEM — C34.90 MALIGNANT NEOPLASM OF UNSPECIFIED PART OF UNSPECIFIED BRONCHUS OR LUNG: Chronic | Status: ACTIVE | Noted: 2025-01-08

## 2025-01-22 PROCEDURE — 78815 PET IMAGE W/CT SKULL-THIGH: CPT

## 2025-01-22 PROCEDURE — A9552: CPT

## 2025-01-22 PROCEDURE — 78815 PET IMAGE W/CT SKULL-THIGH: CPT | Mod: 26,PS,KX

## 2025-03-19 ENCOUNTER — NON-APPOINTMENT (OUTPATIENT)
Age: 65
End: 2025-03-19

## 2025-03-19 ENCOUNTER — APPOINTMENT (OUTPATIENT)
Dept: CARDIOLOGY | Facility: CLINIC | Age: 65
End: 2025-03-19
Payer: MEDICARE

## 2025-03-19 VITALS
OXYGEN SATURATION: 100 % | RESPIRATION RATE: 16 BRPM | DIASTOLIC BLOOD PRESSURE: 72 MMHG | HEART RATE: 75 BPM | WEIGHT: 188 LBS | SYSTOLIC BLOOD PRESSURE: 126 MMHG | BODY MASS INDEX: 26.76 KG/M2

## 2025-03-19 DIAGNOSIS — I25.10 ATHEROSCLEROTIC HEART DISEASE OF NATIVE CORONARY ARTERY W/OUT ANGINA PECTORIS: ICD-10-CM

## 2025-03-19 DIAGNOSIS — R06.02 SHORTNESS OF BREATH: ICD-10-CM

## 2025-03-19 DIAGNOSIS — Z29.89 ENCOUNTER. FOR OTHER SPECIFIED PROPHYLACTIC MEASURES: ICD-10-CM

## 2025-03-19 DIAGNOSIS — R00.2 PALPITATIONS: ICD-10-CM

## 2025-03-19 DIAGNOSIS — Z95.820 PERIPHERAL VASCULAR ANGIOPLASTY STATUS WITH IMPLANTS AND GRAFTS: ICD-10-CM

## 2025-03-19 PROCEDURE — 99214 OFFICE O/P EST MOD 30 MIN: CPT

## 2025-03-19 PROCEDURE — 93000 ELECTROCARDIOGRAM COMPLETE: CPT

## 2025-03-19 PROCEDURE — G2211 COMPLEX E/M VISIT ADD ON: CPT

## 2025-04-07 ENCOUNTER — APPOINTMENT (OUTPATIENT)
Dept: NUCLEAR MEDICINE | Facility: CLINIC | Age: 65
End: 2025-04-07
Payer: MEDICARE

## 2025-04-07 ENCOUNTER — OUTPATIENT (OUTPATIENT)
Dept: OUTPATIENT SERVICES | Facility: HOSPITAL | Age: 65
LOS: 1 days | End: 2025-04-07
Payer: MEDICARE

## 2025-04-07 DIAGNOSIS — Z95.828 PRESENCE OF OTHER VASCULAR IMPLANTS AND GRAFTS: Chronic | ICD-10-CM

## 2025-04-07 DIAGNOSIS — R59.0 LOCALIZED ENLARGED LYMPH NODES: ICD-10-CM

## 2025-04-07 DIAGNOSIS — Z98.890 OTHER SPECIFIED POSTPROCEDURAL STATES: Chronic | ICD-10-CM

## 2025-04-07 DIAGNOSIS — C78.01 SECONDARY MALIGNANT NEOPLASM OF RIGHT LUNG: ICD-10-CM

## 2025-04-07 DIAGNOSIS — C65.1 MALIGNANT NEOPLASM OF RIGHT RENAL PELVIS: ICD-10-CM

## 2025-04-07 DIAGNOSIS — C78.02 SECONDARY MALIGNANT NEOPLASM OF LEFT LUNG: ICD-10-CM

## 2025-04-07 DIAGNOSIS — R91.8 OTHER NONSPECIFIC ABNORMAL FINDING OF LUNG FIELD: ICD-10-CM

## 2025-04-07 DIAGNOSIS — Z95.5 PRESENCE OF CORONARY ANGIOPLASTY IMPLANT AND GRAFT: Chronic | ICD-10-CM

## 2025-04-07 DIAGNOSIS — Z90.5 ACQUIRED ABSENCE OF KIDNEY: Chronic | ICD-10-CM

## 2025-04-07 PROCEDURE — 78815 PET IMAGE W/CT SKULL-THIGH: CPT | Mod: 26,PS,KX

## 2025-04-07 PROCEDURE — 78815 PET IMAGE W/CT SKULL-THIGH: CPT | Mod: MH

## 2025-04-07 PROCEDURE — A9552: CPT

## 2025-04-15 ENCOUNTER — NON-APPOINTMENT (OUTPATIENT)
Age: 65
End: 2025-04-15

## 2025-04-16 ENCOUNTER — NON-APPOINTMENT (OUTPATIENT)
Age: 65
End: 2025-04-16

## 2025-04-17 ENCOUNTER — APPOINTMENT (OUTPATIENT)
Dept: UROLOGY | Facility: CLINIC | Age: 65
End: 2025-04-17
Payer: MEDICARE

## 2025-04-17 VITALS
BODY MASS INDEX: 24.39 KG/M2 | HEIGHT: 73 IN | WEIGHT: 184 LBS | DIASTOLIC BLOOD PRESSURE: 60 MMHG | HEART RATE: 87 BPM | SYSTOLIC BLOOD PRESSURE: 96 MMHG | TEMPERATURE: 98.1 F | OXYGEN SATURATION: 99 %

## 2025-04-17 DIAGNOSIS — N40.1 BENIGN PROSTATIC HYPERPLASIA WITH LOWER URINARY TRACT SYMPMS: ICD-10-CM

## 2025-04-17 DIAGNOSIS — C64.9 MALIGNANT NEOPLASM OF UNSPECIFIED KIDNEY, EXCEPT RENAL PELVIS: ICD-10-CM

## 2025-04-17 DIAGNOSIS — N40.0 BENIGN PROSTATIC HYPERPLASIA WITHOUT LOWER URINARY TRACT SYMPMS: ICD-10-CM

## 2025-04-17 DIAGNOSIS — Z12.5 ENCOUNTER FOR SCREENING FOR MALIGNANT NEOPLASM OF PROSTATE: ICD-10-CM

## 2025-04-17 DIAGNOSIS — N52.9 MALE ERECTILE DYSFUNCTION, UNSPECIFIED: ICD-10-CM

## 2025-04-17 DIAGNOSIS — Z85.528 PERSONAL HISTORY OF OTHER MALIGNANT NEOPLASM OF KIDNEY: ICD-10-CM

## 2025-04-17 DIAGNOSIS — N41.9 INFLAMMATORY DISEASE OF PROSTATE, UNSPECIFIED: ICD-10-CM

## 2025-04-17 DIAGNOSIS — N13.8 BENIGN PROSTATIC HYPERPLASIA WITH LOWER URINARY TRACT SYMPMS: ICD-10-CM

## 2025-04-17 PROCEDURE — 51798 US URINE CAPACITY MEASURE: CPT

## 2025-04-17 PROCEDURE — 99205 OFFICE O/P NEW HI 60 MIN: CPT

## 2025-04-17 RX ORDER — LEVOTHYROXINE SODIUM 112 UG/1
112 TABLET ORAL
Refills: 0 | Status: ACTIVE | COMMUNITY

## 2025-04-17 RX ORDER — ALFUZOSIN HYDROCHLORIDE 10 MG/1
10 TABLET, EXTENDED RELEASE ORAL DAILY
Qty: 90 | Refills: 3 | Status: ACTIVE | COMMUNITY
Start: 2025-04-17 | End: 1900-01-01

## 2025-04-17 RX ORDER — DAPAGLIFLOZIN 5 MG/1
5 TABLET, FILM COATED ORAL
Refills: 0 | Status: ACTIVE | COMMUNITY

## 2025-04-21 LAB
APPEARANCE: CLEAR
BACTERIA UR CULT: NORMAL
BACTERIA: NEGATIVE /HPF
BILIRUBIN URINE: NEGATIVE
BLOOD URINE: NEGATIVE
CAST: 0 /LPF
COLOR: YELLOW
EPITHELIAL CELLS: 0 /HPF
GLUCOSE QUALITATIVE U: 500 MG/DL
KETONES URINE: NEGATIVE MG/DL
LEUKOCYTE ESTERASE URINE: NEGATIVE
MICROSCOPIC-UA: NORMAL
NITRITE URINE: NEGATIVE
PH URINE: 6.5
PROTEIN URINE: NEGATIVE MG/DL
PSA FREE FLD-MCNC: 71 %
PSA FREE SERPL-MCNC: 0.36 NG/ML
PSA SERPL-MCNC: 0.51 NG/ML
RED BLOOD CELLS URINE: 0 /HPF
SPECIFIC GRAVITY URINE: 1.01
UROBILINOGEN URINE: 0.2 MG/DL
WHITE BLOOD CELLS URINE: 0 /HPF

## 2025-05-29 ENCOUNTER — APPOINTMENT (OUTPATIENT)
Dept: UROLOGY | Facility: CLINIC | Age: 65
End: 2025-05-29
Payer: MEDICARE

## 2025-05-29 DIAGNOSIS — Z12.5 ENCOUNTER FOR SCREENING FOR MALIGNANT NEOPLASM OF PROSTATE: ICD-10-CM

## 2025-05-29 DIAGNOSIS — N40.1 BENIGN PROSTATIC HYPERPLASIA WITH LOWER URINARY TRACT SYMPMS: ICD-10-CM

## 2025-05-29 DIAGNOSIS — C64.9 MALIGNANT NEOPLASM OF UNSPECIFIED KIDNEY, EXCEPT RENAL PELVIS: ICD-10-CM

## 2025-05-29 DIAGNOSIS — N52.9 MALE ERECTILE DYSFUNCTION, UNSPECIFIED: ICD-10-CM

## 2025-05-29 DIAGNOSIS — N13.8 BENIGN PROSTATIC HYPERPLASIA WITH LOWER URINARY TRACT SYMPMS: ICD-10-CM

## 2025-05-29 PROCEDURE — 99214 OFFICE O/P EST MOD 30 MIN: CPT

## 2025-05-29 PROCEDURE — 51798 US URINE CAPACITY MEASURE: CPT

## 2025-06-03 ENCOUNTER — APPOINTMENT (OUTPATIENT)
Dept: CARDIOLOGY | Facility: CLINIC | Age: 65
End: 2025-06-03
Payer: MEDICARE

## 2025-06-03 PROCEDURE — A9500: CPT | Mod: JZ

## 2025-06-03 PROCEDURE — 78452 HT MUSCLE IMAGE SPECT MULT: CPT

## 2025-06-03 PROCEDURE — 93015 CV STRESS TEST SUPVJ I&R: CPT

## 2025-06-12 NOTE — ED ADULT NURSE NOTE - CADM POA PRESS ULCER
Osito Krishnamurthy,     If you are due for any health screening(s) below please notify me so we can arrange them to be ordered and scheduled. Most healthy patients at your age complete them, but you are free to accept or refuse.     If you can't do it, I'll definitely understand. If you can, I'd certainly appreciate it!    All of your core healthy metrics are met.                      No

## 2025-07-05 ENCOUNTER — OUTPATIENT (OUTPATIENT)
Dept: OUTPATIENT SERVICES | Facility: HOSPITAL | Age: 65
LOS: 1 days | End: 2025-07-05
Payer: MEDICARE

## 2025-07-05 ENCOUNTER — APPOINTMENT (OUTPATIENT)
Dept: NUCLEAR MEDICINE | Facility: CLINIC | Age: 65
End: 2025-07-05

## 2025-07-05 DIAGNOSIS — Z95.828 PRESENCE OF OTHER VASCULAR IMPLANTS AND GRAFTS: Chronic | ICD-10-CM

## 2025-07-05 DIAGNOSIS — Z90.5 ACQUIRED ABSENCE OF KIDNEY: Chronic | ICD-10-CM

## 2025-07-05 DIAGNOSIS — Z98.890 OTHER SPECIFIED POSTPROCEDURAL STATES: Chronic | ICD-10-CM

## 2025-07-05 DIAGNOSIS — Z00.8 ENCOUNTER FOR OTHER GENERAL EXAMINATION: ICD-10-CM

## 2025-07-05 DIAGNOSIS — Z95.5 PRESENCE OF CORONARY ANGIOPLASTY IMPLANT AND GRAFT: Chronic | ICD-10-CM

## 2025-07-05 PROCEDURE — 78815 PET IMAGE W/CT SKULL-THIGH: CPT | Mod: MH

## 2025-07-05 PROCEDURE — 78815 PET IMAGE W/CT SKULL-THIGH: CPT | Mod: 26,PS,KX

## 2025-07-05 PROCEDURE — A9552: CPT

## 2025-08-18 ENCOUNTER — APPOINTMENT (OUTPATIENT)
Dept: MRI IMAGING | Facility: CLINIC | Age: 65
End: 2025-08-18
Payer: MEDICARE

## 2025-08-18 ENCOUNTER — OUTPATIENT (OUTPATIENT)
Dept: OUTPATIENT SERVICES | Facility: HOSPITAL | Age: 65
LOS: 1 days | End: 2025-08-18
Payer: MEDICARE

## 2025-08-18 DIAGNOSIS — R91.8 OTHER NONSPECIFIC ABNORMAL FINDING OF LUNG FIELD: ICD-10-CM

## 2025-08-18 DIAGNOSIS — Z95.5 PRESENCE OF CORONARY ANGIOPLASTY IMPLANT AND GRAFT: Chronic | ICD-10-CM

## 2025-08-18 DIAGNOSIS — R59.0 LOCALIZED ENLARGED LYMPH NODES: ICD-10-CM

## 2025-08-18 DIAGNOSIS — C65.1 MALIGNANT NEOPLASM OF RIGHT RENAL PELVIS: ICD-10-CM

## 2025-08-18 DIAGNOSIS — Z98.890 OTHER SPECIFIED POSTPROCEDURAL STATES: Chronic | ICD-10-CM

## 2025-08-18 DIAGNOSIS — C77.9 SECONDARY AND UNSPECIFIED MALIGNANT NEOPLASM OF LYMPH NODE, UNSPECIFIED: ICD-10-CM

## 2025-08-18 DIAGNOSIS — C78.01 SECONDARY MALIGNANT NEOPLASM OF RIGHT LUNG: ICD-10-CM

## 2025-08-18 DIAGNOSIS — Z95.828 PRESENCE OF OTHER VASCULAR IMPLANTS AND GRAFTS: Chronic | ICD-10-CM

## 2025-08-18 DIAGNOSIS — Z90.5 ACQUIRED ABSENCE OF KIDNEY: Chronic | ICD-10-CM

## 2025-08-18 PROCEDURE — 72195 MRI PELVIS W/O DYE: CPT

## 2025-08-18 PROCEDURE — 72195 MRI PELVIS W/O DYE: CPT | Mod: 26

## 2025-09-03 ENCOUNTER — APPOINTMENT (OUTPATIENT)
Dept: NUCLEAR MEDICINE | Facility: IMAGING CENTER | Age: 65
End: 2025-09-03
Payer: MEDICARE

## 2025-09-03 ENCOUNTER — OUTPATIENT (OUTPATIENT)
Dept: OUTPATIENT SERVICES | Facility: HOSPITAL | Age: 65
LOS: 1 days | End: 2025-09-03
Payer: MEDICARE

## 2025-09-03 DIAGNOSIS — Z98.890 OTHER SPECIFIED POSTPROCEDURAL STATES: Chronic | ICD-10-CM

## 2025-09-03 DIAGNOSIS — Z95.828 PRESENCE OF OTHER VASCULAR IMPLANTS AND GRAFTS: Chronic | ICD-10-CM

## 2025-09-03 DIAGNOSIS — R91.8 OTHER NONSPECIFIC ABNORMAL FINDING OF LUNG FIELD: ICD-10-CM

## 2025-09-03 DIAGNOSIS — Z90.5 ACQUIRED ABSENCE OF KIDNEY: Chronic | ICD-10-CM

## 2025-09-03 DIAGNOSIS — C65.1 MALIGNANT NEOPLASM OF RIGHT RENAL PELVIS: ICD-10-CM

## 2025-09-03 PROCEDURE — 78306 BONE IMAGING WHOLE BODY: CPT | Mod: 26

## 2025-09-03 PROCEDURE — 78306 BONE IMAGING WHOLE BODY: CPT | Mod: MH

## 2025-09-03 PROCEDURE — A9561: CPT

## 2025-09-05 ENCOUNTER — APPOINTMENT (OUTPATIENT)
Dept: UROLOGY | Facility: CLINIC | Age: 65
End: 2025-09-05
Payer: MEDICARE

## 2025-09-05 VITALS
HEART RATE: 73 BPM | BODY MASS INDEX: 24.92 KG/M2 | DIASTOLIC BLOOD PRESSURE: 74 MMHG | WEIGHT: 184 LBS | OXYGEN SATURATION: 98 % | SYSTOLIC BLOOD PRESSURE: 115 MMHG | HEIGHT: 72 IN

## 2025-09-05 DIAGNOSIS — C64.9 MALIGNANT NEOPLASM OF UNSPECIFIED KIDNEY, EXCEPT RENAL PELVIS: ICD-10-CM

## 2025-09-05 DIAGNOSIS — Z12.5 ENCOUNTER FOR SCREENING FOR MALIGNANT NEOPLASM OF PROSTATE: ICD-10-CM

## 2025-09-05 DIAGNOSIS — N40.1 BENIGN PROSTATIC HYPERPLASIA WITH LOWER URINARY TRACT SYMPMS: ICD-10-CM

## 2025-09-05 DIAGNOSIS — N52.9 MALE ERECTILE DYSFUNCTION, UNSPECIFIED: ICD-10-CM

## 2025-09-05 DIAGNOSIS — N13.8 BENIGN PROSTATIC HYPERPLASIA WITH LOWER URINARY TRACT SYMPMS: ICD-10-CM

## 2025-09-05 PROCEDURE — 99214 OFFICE O/P EST MOD 30 MIN: CPT

## 2025-09-05 PROCEDURE — 51798 US URINE CAPACITY MEASURE: CPT

## 2025-09-18 ENCOUNTER — APPOINTMENT (OUTPATIENT)
Dept: CARDIOLOGY | Facility: CLINIC | Age: 65
End: 2025-09-18
Payer: MEDICARE

## 2025-09-18 VITALS
OXYGEN SATURATION: 98 % | SYSTOLIC BLOOD PRESSURE: 130 MMHG | BODY MASS INDEX: 25.77 KG/M2 | HEART RATE: 72 BPM | RESPIRATION RATE: 16 BRPM | DIASTOLIC BLOOD PRESSURE: 78 MMHG | WEIGHT: 190 LBS

## 2025-09-18 DIAGNOSIS — Z87.448 PERSONAL HISTORY OF OTHER DISEASES OF URINARY SYSTEM: ICD-10-CM

## 2025-09-18 DIAGNOSIS — Z95.820 PERIPHERAL VASCULAR ANGIOPLASTY STATUS WITH IMPLANTS AND GRAFTS: ICD-10-CM

## 2025-09-18 DIAGNOSIS — R07.89 OTHER CHEST PAIN: ICD-10-CM

## 2025-09-18 DIAGNOSIS — I25.10 ATHEROSCLEROTIC HEART DISEASE OF NATIVE CORONARY ARTERY W/OUT ANGINA PECTORIS: ICD-10-CM

## 2025-09-18 PROCEDURE — G2211 COMPLEX E/M VISIT ADD ON: CPT

## 2025-09-18 PROCEDURE — 99214 OFFICE O/P EST MOD 30 MIN: CPT

## 2025-09-18 PROCEDURE — 93000 ELECTROCARDIOGRAM COMPLETE: CPT

## (undated) DEVICE — ELCTR BIPOLAR CORD J&J 12FT DISP

## (undated) DEVICE — ELCTR EXTENSION STRAIGHT

## (undated) DEVICE — SUT MONOCRYL 4-0 27" PS-2 UNDYED

## (undated) DEVICE — TUBING SUCTION NONCONDUCTIVE 6MM X 12FT

## (undated) DEVICE — DRAPE MAYO STAND 23"

## (undated) DEVICE — SOL BALANCE SALT 15ML

## (undated) DEVICE — SUT VICRYL 8-0 12" TG140-8 DA

## (undated) DEVICE — VENODYNE/SCD SLEEVE CALF MEDIUM

## (undated) DEVICE — DRAPE IOBAN 33" X 23"

## (undated) DEVICE — CHEST DRAIN PLEUR-EVAC DRY/WET ADULT-PEDS SINGLE (QUICK)

## (undated) DEVICE — WARMING BLANKET LOWER ADULT

## (undated) DEVICE — VALVE BIOPSY BRONCHOVIDEOSCOPE

## (undated) DEVICE — VALVE SUCTION EVIS 160/200/240

## (undated) DEVICE — POSITIONER STRAP ARMBOARD VELCRO TS-30

## (undated) DEVICE — CONNECTOR REDUCING STRAIGHT 3/8X0.25"

## (undated) DEVICE — CANNULA ALCON SOFT TIP 23G

## (undated) DEVICE — BALLOON SINGLE FOR BF-UC160F

## (undated) DEVICE — SPECIMEN CONTAINER 100ML

## (undated) DEVICE — TRAP SPECIMEN SPUTUM 40CC

## (undated) DEVICE — PREP CHLORAPREP HI-LITE ORANGE 26ML

## (undated) DEVICE — SUT SILK 0 18" TIES

## (undated) DEVICE — DRSG STERISTRIPS 0.5 X 4"

## (undated) DEVICE — SUT VICRYL 3-0 27" SH UNDYED

## (undated) DEVICE — DRSG CURITY GAUZE SPONGE 4 X 4" 12-PLY

## (undated) DEVICE — ELCTR BOVIE TIP BLADE INSULATED 6.5" EDGE

## (undated) DEVICE — CANNULA ALCON SOFT TIP 25G

## (undated) DEVICE — PROTECTOR HEEL / ELBOW FLUFFY

## (undated) DEVICE — SOL IRR POUR NS 0.9% 500ML

## (undated) DEVICE — PACK VITRECTOMY

## (undated) DEVICE — NDL ASPIRATION VIZISHOT2 21G

## (undated) DEVICE — NDL HYPO REGULAR BEVEL 25G X 1.5" (BLUE)

## (undated) DEVICE — ELCTR GROUNDING PAD ADULT COVIDIEN

## (undated) DEVICE — DRAPE 3/4 SHEET 52X76"

## (undated) DEVICE — DRSG BENZOIN 0.6CC

## (undated) DEVICE — SYR LUER LOK 20CC

## (undated) DEVICE — STOPCOCK 3 WAY

## (undated) DEVICE — DRSG TELFA 3 X 8

## (undated) DEVICE — SYR SLIP 10CC

## (undated) DEVICE — SUT SILK 2-0 30" TIES

## (undated) DEVICE — LIJ-LERUTE VIDEO MEDIASTINOSCOPY TRAY: Type: DURABLE MEDICAL EQUIPMENT

## (undated) DEVICE — KNIFE SPECIALTY BLADE TIP LAMELLAR BLADE ANGL BVL UP 2.3MM

## (undated) DEVICE — DRAPE OPHTHALMIC W POUCH

## (undated) DEVICE — NDL HYPO SAFE 18G X 1.5" (PINK)

## (undated) DEVICE — BLADE SCLERAL #57

## (undated) DEVICE — CONSTELLATION TOTAL PLUS PAK 23G

## (undated) DEVICE — PACK MAJOR ABDOMINAL W ENDO DRAPE

## (undated) DEVICE — DRAPE MAGNETIC INSTRUMENT MEDIUM

## (undated) DEVICE — DURABLE MEDICAL EQUIPMENT: Type: DURABLE MEDICAL EQUIPMENT

## (undated) DEVICE — SUT PROLENE 0 30" CT-1

## (undated) DEVICE — DRAPE MICROSCOPE RESIGHT

## (undated) DEVICE — AUTO GAS FILL CONSTELLATION

## (undated) DEVICE — SOL IRR SALT BSS PLUS 500ML

## (undated) DEVICE — PACK CONSTELLATION POST 25G 20K

## (undated) DEVICE — DIATHERMY PROBE 25GA

## (undated) DEVICE — SOL SYR OPHTHALMIC TISSUEBLUE BRILLIANT BLUE G 0.025%

## (undated) DEVICE — DISSECTOR ENDOSCOPIC KITTNER SINGLE TIP

## (undated) DEVICE — VISITEC 4X4

## (undated) DEVICE — SYR LUER LOK 10CC

## (undated) DEVICE — SOL ANTI FOG

## (undated) DEVICE — DRSG TEGADERM 4X4.75"

## (undated) DEVICE — TAPE SILK 3"

## (undated) DEVICE — SUT VICRYL 0 27" CT-1 UNDYED

## (undated) DEVICE — ADAPTER FIBEROPTIC BRONCHOSCOPE DUAL AXIS SWIVEL

## (undated) DEVICE — SUT VICRYL 2-0 27" UR-6

## (undated) DEVICE — ELCTR BOVIE TIP BLADE INSULATED 2.75" EDGE